# Patient Record
Sex: MALE | Race: WHITE | NOT HISPANIC OR LATINO | Employment: UNEMPLOYED | URBAN - METROPOLITAN AREA
[De-identification: names, ages, dates, MRNs, and addresses within clinical notes are randomized per-mention and may not be internally consistent; named-entity substitution may affect disease eponyms.]

---

## 2021-02-19 ENCOUNTER — TELEPHONE (OUTPATIENT)
Dept: BEHAVIORAL HEALTH | Facility: CLINIC | Age: 31
End: 2021-02-19

## 2021-02-19 ENCOUNTER — HOSPITAL ENCOUNTER (INPATIENT)
Facility: CLINIC | Age: 31
LOS: 2 days | Discharge: HOME | DRG: 897 | End: 2021-02-21
Attending: EMERGENCY MEDICINE | Admitting: PSYCHIATRY & NEUROLOGY

## 2021-02-19 DIAGNOSIS — F10.930 ALCOHOL WITHDRAWAL SYNDROME WITHOUT COMPLICATION (H): ICD-10-CM

## 2021-02-19 DIAGNOSIS — Z20.822 COVID-19 RULED OUT BY LABORATORY TESTING: ICD-10-CM

## 2021-02-19 LAB
ALBUMIN SERPL-MCNC: 4.3 G/DL (ref 3.4–5)
ALCOHOL BREATH TEST: 0 (ref 0–0.01)
ALP SERPL-CCNC: 184 U/L (ref 40–150)
ALT SERPL W P-5'-P-CCNC: 166 U/L (ref 0–70)
AMPHETAMINES UR QL SCN: NEGATIVE
ANION GAP SERPL CALCULATED.3IONS-SCNC: 9 MMOL/L (ref 3–14)
AST SERPL W P-5'-P-CCNC: 487 U/L (ref 0–45)
BARBITURATES UR QL: NEGATIVE
BASOPHILS # BLD AUTO: 0.1 10E9/L (ref 0–0.2)
BASOPHILS NFR BLD AUTO: 1 %
BENZODIAZ UR QL: NEGATIVE
BILIRUB SERPL-MCNC: 1.4 MG/DL (ref 0.2–1.3)
BUN SERPL-MCNC: 8 MG/DL (ref 7–30)
CALCIUM SERPL-MCNC: 9.2 MG/DL (ref 8.5–10.1)
CANNABINOIDS UR QL SCN: POSITIVE
CHLORIDE SERPL-SCNC: 98 MMOL/L (ref 94–109)
CO2 SERPL-SCNC: 29 MMOL/L (ref 20–32)
COCAINE UR QL: POSITIVE
CREAT SERPL-MCNC: 0.91 MG/DL (ref 0.66–1.25)
DIFFERENTIAL METHOD BLD: ABNORMAL
EOSINOPHIL # BLD AUTO: 0 10E9/L (ref 0–0.7)
EOSINOPHIL NFR BLD AUTO: 0.3 %
ERYTHROCYTE [DISTWIDTH] IN BLOOD BY AUTOMATED COUNT: 14.5 % (ref 10–15)
ETHANOL UR QL SCN: NEGATIVE
GFR SERPL CREATININE-BSD FRML MDRD: >90 ML/MIN/{1.73_M2}
GLUCOSE SERPL-MCNC: 89 MG/DL (ref 70–99)
HCT VFR BLD AUTO: 36.1 % (ref 40–53)
HGB BLD-MCNC: 12.8 G/DL (ref 13.3–17.7)
IMM GRANULOCYTES # BLD: 0.1 10E9/L (ref 0–0.4)
IMM GRANULOCYTES NFR BLD: 0.8 %
LABORATORY COMMENT REPORT: NORMAL
LYMPHOCYTES # BLD AUTO: 0.7 10E9/L (ref 0.8–5.3)
LYMPHOCYTES NFR BLD AUTO: 11.6 %
MAGNESIUM SERPL-MCNC: 1.9 MG/DL (ref 1.6–2.3)
MCH RBC QN AUTO: 34 PG (ref 26.5–33)
MCHC RBC AUTO-ENTMCNC: 35.5 G/DL (ref 31.5–36.5)
MCV RBC AUTO: 96 FL (ref 78–100)
MONOCYTES # BLD AUTO: 0.7 10E9/L (ref 0–1.3)
MONOCYTES NFR BLD AUTO: 11.4 %
NEUTROPHILS # BLD AUTO: 4.5 10E9/L (ref 1.6–8.3)
NEUTROPHILS NFR BLD AUTO: 74.9 %
NRBC # BLD AUTO: 0 10*3/UL
NRBC BLD AUTO-RTO: 0 /100
OPIATES UR QL SCN: NEGATIVE
PLATELET # BLD AUTO: 175 10E9/L (ref 150–450)
POTASSIUM SERPL-SCNC: 3.6 MMOL/L (ref 3.4–5.3)
PROT SERPL-MCNC: 8.2 G/DL (ref 6.8–8.8)
RBC # BLD AUTO: 3.76 10E12/L (ref 4.4–5.9)
SARS-COV-2 RNA RESP QL NAA+PROBE: NEGATIVE
SODIUM SERPL-SCNC: 136 MMOL/L (ref 133–144)
SPECIMEN SOURCE: NORMAL
WBC # BLD AUTO: 6 10E9/L (ref 4–11)

## 2021-02-19 PROCEDURE — 87635 SARS-COV-2 COVID-19 AMP PRB: CPT | Performed by: EMERGENCY MEDICINE

## 2021-02-19 PROCEDURE — 258N000003 HC RX IP 258 OP 636: Performed by: EMERGENCY MEDICINE

## 2021-02-19 PROCEDURE — 80320 DRUG SCREEN QUANTALCOHOLS: CPT | Performed by: EMERGENCY MEDICINE

## 2021-02-19 PROCEDURE — 96374 THER/PROPH/DIAG INJ IV PUSH: CPT | Performed by: EMERGENCY MEDICINE

## 2021-02-19 PROCEDURE — 250N000013 HC RX MED GY IP 250 OP 250 PS 637: Performed by: NURSE PRACTITIONER

## 2021-02-19 PROCEDURE — 128N000004 HC R&B CD ADULT

## 2021-02-19 PROCEDURE — 99285 EMERGENCY DEPT VISIT HI MDM: CPT | Performed by: EMERGENCY MEDICINE

## 2021-02-19 PROCEDURE — 82075 ASSAY OF BREATH ETHANOL: CPT | Performed by: EMERGENCY MEDICINE

## 2021-02-19 PROCEDURE — 83735 ASSAY OF MAGNESIUM: CPT | Performed by: EMERGENCY MEDICINE

## 2021-02-19 PROCEDURE — 99285 EMERGENCY DEPT VISIT HI MDM: CPT | Mod: 25 | Performed by: EMERGENCY MEDICINE

## 2021-02-19 PROCEDURE — 250N000013 HC RX MED GY IP 250 OP 250 PS 637: Performed by: EMERGENCY MEDICINE

## 2021-02-19 PROCEDURE — 80307 DRUG TEST PRSMV CHEM ANLYZR: CPT | Performed by: EMERGENCY MEDICINE

## 2021-02-19 PROCEDURE — 250N000011 HC RX IP 250 OP 636: Performed by: EMERGENCY MEDICINE

## 2021-02-19 PROCEDURE — 96361 HYDRATE IV INFUSION ADD-ON: CPT | Performed by: EMERGENCY MEDICINE

## 2021-02-19 PROCEDURE — 80053 COMPREHEN METABOLIC PANEL: CPT | Performed by: EMERGENCY MEDICINE

## 2021-02-19 PROCEDURE — 85025 COMPLETE CBC W/AUTO DIFF WBC: CPT | Performed by: EMERGENCY MEDICINE

## 2021-02-19 PROCEDURE — C9803 HOPD COVID-19 SPEC COLLECT: HCPCS | Performed by: EMERGENCY MEDICINE

## 2021-02-19 RX ORDER — TRAZODONE HYDROCHLORIDE 50 MG/1
50 TABLET, FILM COATED ORAL
Status: DISCONTINUED | OUTPATIENT
Start: 2021-02-19 | End: 2021-02-21 | Stop reason: HOSPADM

## 2021-02-19 RX ORDER — ONDANSETRON 2 MG/ML
4 INJECTION INTRAMUSCULAR; INTRAVENOUS ONCE
Status: COMPLETED | OUTPATIENT
Start: 2021-02-19 | End: 2021-02-19

## 2021-02-19 RX ORDER — MAGNESIUM HYDROXIDE/ALUMINUM HYDROXICE/SIMETHICONE 120; 1200; 1200 MG/30ML; MG/30ML; MG/30ML
30 SUSPENSION ORAL EVERY 4 HOURS PRN
Status: DISCONTINUED | OUTPATIENT
Start: 2021-02-19 | End: 2021-02-21 | Stop reason: HOSPADM

## 2021-02-19 RX ORDER — AMOXICILLIN 250 MG
1 CAPSULE ORAL 2 TIMES DAILY PRN
Status: DISCONTINUED | OUTPATIENT
Start: 2021-02-19 | End: 2021-02-21 | Stop reason: HOSPADM

## 2021-02-19 RX ORDER — LOPERAMIDE HCL 2 MG
2 CAPSULE ORAL 4 TIMES DAILY PRN
Status: DISCONTINUED | OUTPATIENT
Start: 2021-02-19 | End: 2021-02-21 | Stop reason: HOSPADM

## 2021-02-19 RX ORDER — FOLIC ACID 1 MG/1
1 TABLET ORAL DAILY
Status: DISCONTINUED | OUTPATIENT
Start: 2021-02-19 | End: 2021-02-19

## 2021-02-19 RX ORDER — MULTIPLE VITAMINS W/ MINERALS TAB 9MG-400MCG
1 TAB ORAL DAILY
Status: DISCONTINUED | OUTPATIENT
Start: 2021-02-20 | End: 2021-02-21 | Stop reason: HOSPADM

## 2021-02-19 RX ORDER — MULTIPLE VITAMINS W/ MINERALS TAB 9MG-400MCG
1 TAB ORAL DAILY
Status: DISCONTINUED | OUTPATIENT
Start: 2021-02-19 | End: 2021-02-19

## 2021-02-19 RX ORDER — IBUPROFEN 600 MG/1
600 TABLET, FILM COATED ORAL EVERY 6 HOURS PRN
Status: DISCONTINUED | OUTPATIENT
Start: 2021-02-19 | End: 2021-02-21 | Stop reason: HOSPADM

## 2021-02-19 RX ORDER — ONDANSETRON 4 MG/1
4 TABLET, ORALLY DISINTEGRATING ORAL EVERY 6 HOURS PRN
Status: DISCONTINUED | OUTPATIENT
Start: 2021-02-19 | End: 2021-02-21 | Stop reason: HOSPADM

## 2021-02-19 RX ORDER — IBUPROFEN 200 MG
400 TABLET ORAL EVERY 4 HOURS PRN
Status: ON HOLD | COMMUNITY
End: 2021-08-28

## 2021-02-19 RX ORDER — ATENOLOL 50 MG/1
50 TABLET ORAL DAILY PRN
Status: DISCONTINUED | OUTPATIENT
Start: 2021-02-19 | End: 2021-02-21 | Stop reason: HOSPADM

## 2021-02-19 RX ORDER — LANOLIN ALCOHOL/MO/W.PET/CERES
100 CREAM (GRAM) TOPICAL DAILY
Status: DISCONTINUED | OUTPATIENT
Start: 2021-02-19 | End: 2021-02-19

## 2021-02-19 RX ORDER — DIAZEPAM 5 MG
5-20 TABLET ORAL EVERY 30 MIN PRN
Status: DISCONTINUED | OUTPATIENT
Start: 2021-02-19 | End: 2021-02-19

## 2021-02-19 RX ORDER — FOLIC ACID 1 MG/1
1 TABLET ORAL DAILY
Status: DISCONTINUED | OUTPATIENT
Start: 2021-02-20 | End: 2021-02-21 | Stop reason: HOSPADM

## 2021-02-19 RX ORDER — LANOLIN ALCOHOL/MO/W.PET/CERES
100 CREAM (GRAM) TOPICAL DAILY
Status: DISCONTINUED | OUTPATIENT
Start: 2021-02-20 | End: 2021-02-21 | Stop reason: HOSPADM

## 2021-02-19 RX ORDER — LORAZEPAM 1 MG/1
1-4 TABLET ORAL EVERY 30 MIN PRN
Status: DISCONTINUED | OUTPATIENT
Start: 2021-02-19 | End: 2021-02-21 | Stop reason: HOSPADM

## 2021-02-19 RX ORDER — HYDROXYZINE HYDROCHLORIDE 25 MG/1
25 TABLET, FILM COATED ORAL EVERY 4 HOURS PRN
Status: DISCONTINUED | OUTPATIENT
Start: 2021-02-19 | End: 2021-02-21 | Stop reason: HOSPADM

## 2021-02-19 RX ORDER — DIAZEPAM 10 MG
10 TABLET ORAL ONCE
Status: COMPLETED | OUTPATIENT
Start: 2021-02-19 | End: 2021-02-19

## 2021-02-19 RX ADMIN — NICOTINE POLACRILEX 4 MG: 4 GUM, CHEWING BUCCAL at 16:18

## 2021-02-19 RX ADMIN — NICOTINE POLACRILEX 8 MG: 4 GUM, CHEWING BUCCAL at 20:36

## 2021-02-19 RX ADMIN — FOLIC ACID 1 MG: 1 TABLET ORAL at 13:55

## 2021-02-19 RX ADMIN — MULTIPLE VITAMINS W/ MINERALS TAB 1 TABLET: TAB at 13:55

## 2021-02-19 RX ADMIN — DIAZEPAM 10 MG: 10 TABLET ORAL at 11:04

## 2021-02-19 RX ADMIN — SODIUM CHLORIDE 1000 ML: 9 INJECTION, SOLUTION INTRAVENOUS at 11:04

## 2021-02-19 RX ADMIN — LORAZEPAM 2 MG: 1 TABLET ORAL at 20:36

## 2021-02-19 RX ADMIN — ONDANSETRON 4 MG: 2 INJECTION INTRAMUSCULAR; INTRAVENOUS at 11:04

## 2021-02-19 RX ADMIN — DIAZEPAM 10 MG: 5 TABLET ORAL at 14:03

## 2021-02-19 RX ADMIN — DIAZEPAM 5 MG: 5 TABLET ORAL at 16:18

## 2021-02-19 RX ADMIN — THIAMINE HCL TAB 100 MG 100 MG: 100 TAB at 13:55

## 2021-02-19 ASSESSMENT — ENCOUNTER SYMPTOMS
NECK STIFFNESS: 0
ARTHRALGIAS: 0
SHORTNESS OF BREATH: 0
DIFFICULTY URINATING: 0
FEVER: 0
COLOR CHANGE: 0
HEADACHES: 0
EYE REDNESS: 0
ABDOMINAL PAIN: 0
CONFUSION: 0
DYSPHORIC MOOD: 1
NAUSEA: 1

## 2021-02-19 ASSESSMENT — MIFFLIN-ST. JEOR: SCORE: 1792.72

## 2021-02-19 ASSESSMENT — ACTIVITIES OF DAILY LIVING (ADL)
HYGIENE/GROOMING: INDEPENDENT
LAUNDRY: WITH SUPERVISION
DRESS: INDEPENDENT;SCRUBS (BEHAVIORAL HEALTH)
ORAL_HYGIENE: INDEPENDENT

## 2021-02-19 NOTE — PLAN OF CARE
S:  Lobo is a 29 yo M who comes to [facility] seeking detox from alcohol.  He reports that he drinks three shots in the evening and has been drinking for a total of two years.  Writer did not find this story not credible given the elevation in his ALT & AST (See lab result in Epic 2/19/21).  He is a .  He reported to the ED MD that he drinks shots throughout the day..  He states that he vapes liquid nicotine, approximately = to one ppd of cigarettes.  His Utox was + for cocaine and cannabinoids. He states that he does not use these substances regularly but has used in the last two weeks.   He states that he receives emotional support from his Mom.  He lives in an apartment.  He has a girlfriend (Donna) who stays with him often.  He has a dog named Sergio whom he loves and it gives him satisfaction.    He denied now nor ever in the past, having any thoughts of self harm, suicide or thoughts of harming others.  He admitted that he needs to quit drinking.    He stated that he has had unprotected sex and requested the his blood be tested for HIV.    B:  He denies ever having been to detox or treatment in the past.  He denies any PMH except some knee pain from standing (he takes Ibuprofen for this).  He is not on any prescription medications at this time.    A:  Pt in moderate alcohol withdrawal AEB MSSA scores of 10 & 11  R: Obtained admission orders.  Provided with a nutritious meal and encouraged increased fluid intake.  Oriented to unit, schedule and POC.  Introduced to IM&R Treatment program.  Counseled on smoking cessation. Administered Ativan 2 mg once.  He was in the AA Zoom meeting until 20:00 and was not medicated for his initial score.  He had received a total of 25 mg of diazepam in the ED since 11 am. Writer administered nicotine gum 4 mg (2 pieces).  Continue to monitor and medicate as ordered and indicated.

## 2021-02-19 NOTE — PHARMACY-ADMISSION MEDICATION HISTORY
Admission Medication History Completed by Pharmacy    See [facility] Admission Navigator for allergy information, preferred outpatient pharmacy, prior to admission medications and immunization status.     Medication History Sources:     Lobo    Changes made to PTA medication list (reason):    Added: ibuprofen    Deleted: None    Changed: None    Additional Information:    Lobo verified his home medications.    Prior to Admission medications    Medication Sig Last Dose Taking? Auth Provider   ibuprofen (ADVIL/MOTRIN) 200 MG tablet Take 400 mg by mouth every 4 hours as needed for mild pain  Yes Unknown, Entered By History       Date completed: 02/19/21    Medication history completed by: Luz Lopez, PharmD

## 2021-02-19 NOTE — ED PROVIDER NOTES
ED Provider Note  [facility]      History     Chief Complaint   Patient presents with     Withdrawal     last drink 11pm 2/18/21, drinks throughout day     HPI  Rickey L Brunner is a 30 year old male who presents to the emergency department with concern for alcohol withdrawal.  The patient states he has been drinking alcohol daily for months.  He works as a .  He states he typically drinks shots throughout the day.  He states he typically drinks fireball.  He states that yesterday morning, he felt tremulous and nauseous when he awoke.  He resumed drinking alcohol and the symptoms resolved.  This morning, he had recurrent symptoms.  He states that he did have one episode of dry heaving today.  Patient denies a history of alcohol withdrawal seizures or DTs.  He states he is never experienced these symptoms before.  His last drink was around 11 PM last night.  No history of detox.  Patient does report a history of depression.  He does not have any outpatient providers.  He denies any suicidal ideation.  He denies any abdominal pain.  No diarrhea.  Patient denies any chest pain or dyspnea.  Denies any recent fall or injury.    Past Medical History  History reviewed. No pertinent past medical history.  Past Surgical History:   Procedure Laterality Date     ENT SURGERY      tubes     No current outpatient medications on file.    No Known Allergies  Family History  No family history on file.  Social History   Social History     Tobacco Use     Smoking status: Current Every Day Smoker     Smokeless tobacco: Never Used   Substance Use Topics     Alcohol use: Yes     Comment: daily variety     Drug use: Yes     Types: Marijuana      Past medical history, past surgical history, medications, allergies, family history, and social history were reviewed with the patient. No additional pertinent items.       Review of Systems   Constitutional: Negative for fever.   HENT: Negative for congestion.  "   Eyes: Negative for redness.   Respiratory: Negative for shortness of breath.    Cardiovascular: Negative for chest pain.   Gastrointestinal: Positive for nausea. Negative for abdominal pain.   Genitourinary: Negative for difficulty urinating.   Musculoskeletal: Negative for arthralgias and neck stiffness.   Skin: Negative for color change.   Neurological: Negative for headaches.   Psychiatric/Behavioral: Positive for dysphoric mood. Negative for confusion and suicidal ideas.   All other systems reviewed and are negative.    A complete review of systems was performed with pertinent positives and negatives noted in the HPI, and all other systems negative.    Physical Exam   BP: 127/87  Pulse: 98  Temp: 98.1  F (36.7  C)  Resp: 18  Height: 177.8 cm (5' 10\")  Weight: 81.6 kg (180 lb)  SpO2: 94 %  Physical Exam  Vitals signs and nursing note reviewed.   Constitutional:       General: He is not in acute distress.     Appearance: He is not diaphoretic.   HENT:      Head: Normocephalic and atraumatic.      Mouth/Throat:      Comments: No tongue fasciculations.  Eyes:      General: No scleral icterus.     Pupils: Pupils are equal, round, and reactive to light.   Cardiovascular:      Rate and Rhythm: Normal rate and regular rhythm.      Pulses: Normal pulses.      Heart sounds: Normal heart sounds.   Pulmonary:      Effort: Pulmonary effort is normal. No respiratory distress.      Breath sounds: Normal breath sounds.   Abdominal:      General: Bowel sounds are normal.      Palpations: Abdomen is soft.      Tenderness: There is no abdominal tenderness.   Musculoskeletal: Normal range of motion.         General: No tenderness.   Skin:     General: Skin is warm and dry.      Findings: No rash.   Neurological:      General: No focal deficit present.      Mental Status: He is oriented to person, place, and time.      GCS: GCS eye subscore is 4. GCS verbal subscore is 5. GCS motor subscore is 6.      Motor: No weakness.      " Coordination: Coordination normal.      Gait: Gait normal.      Comments: Tremulous   Psychiatric:         Mood and Affect: Affect is blunt.         Thought Content: Thought content does not include suicidal ideation.         ED Course      Procedures           Results for orders placed or performed during the hospital encounter of 02/19/21   CBC with platelets differential     Status: Abnormal   Result Value Ref Range    WBC 6.0 4.0 - 11.0 10e9/L    RBC Count 3.76 (L) 4.4 - 5.9 10e12/L    Hemoglobin 12.8 (L) 13.3 - 17.7 g/dL    Hematocrit 36.1 (L) 40.0 - 53.0 %    MCV 96 78 - 100 fl    MCH 34.0 (H) 26.5 - 33.0 pg    MCHC 35.5 31.5 - 36.5 g/dL    RDW 14.5 10.0 - 15.0 %    Platelet Count 175 150 - 450 10e9/L    Diff Method Automated Method     % Neutrophils 74.9 %    % Lymphocytes 11.6 %    % Monocytes 11.4 %    % Eosinophils 0.3 %    % Basophils 1.0 %    % Immature Granulocytes 0.8 %    Nucleated RBCs 0 0 /100    Absolute Neutrophil 4.5 1.6 - 8.3 10e9/L    Absolute Lymphocytes 0.7 (L) 0.8 - 5.3 10e9/L    Absolute Monocytes 0.7 0.0 - 1.3 10e9/L    Absolute Eosinophils 0.0 0.0 - 0.7 10e9/L    Absolute Basophils 0.1 0.0 - 0.2 10e9/L    Abs Immature Granulocytes 0.1 0 - 0.4 10e9/L    Absolute Nucleated RBC 0.0    Comprehensive metabolic panel     Status: Abnormal   Result Value Ref Range    Sodium 136 133 - 144 mmol/L    Potassium 3.6 3.4 - 5.3 mmol/L    Chloride 98 94 - 109 mmol/L    Carbon Dioxide 29 20 - 32 mmol/L    Anion Gap 9 3 - 14 mmol/L    Glucose 89 70 - 99 mg/dL    Urea Nitrogen 8 7 - 30 mg/dL    Creatinine 0.91 0.66 - 1.25 mg/dL    GFR Estimate >90 >60 mL/min/[1.73_m2]    GFR Estimate If Black >90 >60 mL/min/[1.73_m2]    Calcium 9.2 8.5 - 10.1 mg/dL    Bilirubin Total 1.4 (H) 0.2 - 1.3 mg/dL    Albumin 4.3 3.4 - 5.0 g/dL    Protein Total 8.2 6.8 - 8.8 g/dL    Alkaline Phosphatase 184 (H) 40 - 150 U/L     (H) 0 - 70 U/L     (H) 0 - 45 U/L   Magnesium     Status: None   Result Value Ref Range     Magnesium 1.9 1.6 - 2.3 mg/dL   Alcohol breath test POCT     Status: Normal   Result Value Ref Range    Alcohol Breath Test 0.000 0.00 - 0.01     Medications   0.9% sodium chloride BOLUS (1,000 mLs Intravenous New Bag 2/19/21 1104)   diazepam (VALIUM) tablet 5-20 mg (has no administration in time range)   thiamine (B-1) tablet 100 mg (has no administration in time range)   folic acid (FOLVITE) tablet 1 mg (has no administration in time range)   multivitamin w/minerals (THERA-VIT-M) tablet 1 tablet (has no administration in time range)   ondansetron (ZOFRAN) injection 4 mg (4 mg Intravenous Given 2/19/21 1104)   diazepam (VALIUM) tablet 10 mg (10 mg Oral Given 2/19/21 1104)        Assessments & Plan (with Medical Decision Making)   30 year old male to the emergency department seeking detox from alcohol.  He has been drinking alcohol daily and develop symptoms of withdrawal yesterday morning.  He had recurrent symptoms this morning.  He arrives to the emergency department with an undetectable alcohol level and clinical evidence for alcohol withdrawal.  Differential diagnosis includes alcohol withdrawal, anxiety, and stimulant intoxication.  Labs and urine toxicology ordered.  He was established.  The patient was given a single dose of ondansetron.  He was given oral Valium and his symptoms are now well controlled.  Suspect symptoms related to alcohol withdrawal.  No history of stimulant abuse.  Patient's labs remarkable for some liver enzyme elevation and anemia.  He does not have significant electrolyte abnormality.  The patient is to tolerate p.o.  He does endorse some symptoms of depression but denies suicidal ideation.  He is asymptomatic for Covid and swab is currently pending.  The patient appears medically stable for detox admission.        I have reviewed all of today's labs and/or imaging.     I have reviewed the nursing notes. I have reviewed the findings, diagnosis, plan and need for follow up with the  patient.    New Prescriptions    No medications on file       Final diagnoses:   Alcohol withdrawal syndrome without complication (H)     Chart documentation was completed with Dragon voice-recognition software. Even though reviewed, this chart may still contain some grammatical, spelling, and word errors.     --  Neri Lopez Md  [facility] EMERGENCY DEPARTMENT  2/19/2021     Neri Lopez MD  02/19/21 5262

## 2021-02-19 NOTE — TELEPHONE ENCOUNTER
Pt presents in [facility] seeking detox.  B: Pt works as  and states he has been drinking daily for months, close to 6 months. Pt states he cant quantify amount sbecasue its shots throughout the day to intoxication. Pt tstaes he wakes up with tremors, nausea, dry heaves and has to drink to subside the withdrawals. Denies seizures, dt's or MH symptoms.   A:   etoh detox.calm, cooperative, vol.  R: veluvali/3a  Patient cleared and ready for behavioral bed placement: Yes   Asymptomatic, test pending

## 2021-02-19 NOTE — ED NOTES
"ED to Behavioral Floor Handoff    SITUATION  Rickey L Brunner is a 30 year old male who speaks English and lives in a home alone The patient arrived in the ED by private car from home with a complaint of Withdrawal (last drink 11pm 2/18/21, drinks throughout day)  .The patient's current symptoms started/worsened 2 year(s) ago and during this time the symptoms have increased.   In the ED, pt was diagnosed with   Final diagnoses:   Alcohol withdrawal syndrome without complication (H)        Initial vitals were: BP: 127/87  Pulse: 98  Temp: 98.1  F (36.7  C)  Resp: 18  Height: 177.8 cm (5' 10\")  Weight: 81.6 kg (180 lb)  SpO2: 94 %   --------  Is the patient diabetic? No   If yes, last blood glucose? --     If yes, was this treated in the ED? --  --------  Is the patient inebriated (ETOH) No or Impaired on other substances? No  MSSA done? Yes  Last MSSA score: 18    Were withdrawal symptoms treated? Yes  Does the patient have a seizure history? No. If yes, date of most recent seizure--  --------  Is the patient patient experiencing suicidal ideation? denies current or recent suicidal ideation     Homicidal ideation? denies current or recent homicidal ideation or behaviors.    Self-injurious behavior/urges? denies current or recent self injurious behavior or ideation.  ------  Was pt aggressive in the ED No  Was a code called No  Is the pt now cooperative? No  -------  Meds given in ED:   Medications   diazepam (VALIUM) tablet 5-20 mg (10 mg Oral Given 2/19/21 1403)   thiamine (B-1) tablet 100 mg (100 mg Oral Given 2/19/21 1355)   folic acid (FOLVITE) tablet 1 mg (1 mg Oral Given 2/19/21 1355)   multivitamin w/minerals (THERA-VIT-M) tablet 1 tablet (1 tablet Oral Given 2/19/21 1355)   0.9% sodium chloride BOLUS (0 mLs Intravenous Stopped 2/19/21 1205)   ondansetron (ZOFRAN) injection 4 mg (4 mg Intravenous Given 2/19/21 1104)   diazepam (VALIUM) tablet 10 mg (10 mg Oral Given 2/19/21 9835)      Family present during ED " course? Yes  Family currently present? Yes    BACKGROUND  Does the patient have a cognitive impairment or developmental disability? No  Allergies: No Known Allergies.   Social demographics are   Social History     Socioeconomic History     Marital status: Single     Spouse name: None     Number of children: None     Years of education: None     Highest education level: None   Occupational History     None   Social Needs     Financial resource strain: None     Food insecurity     Worry: None     Inability: None     Transportation needs     Medical: None     Non-medical: None   Tobacco Use     Smoking status: Current Every Day Smoker     Smokeless tobacco: Never Used   Substance and Sexual Activity     Alcohol use: Yes     Comment: daily variety     Drug use: Yes     Types: Marijuana     Sexual activity: None   Lifestyle     Physical activity     Days per week: None     Minutes per session: None     Stress: None   Relationships     Social connections     Talks on phone: None     Gets together: None     Attends Jew service: None     Active member of club or organization: None     Attends meetings of clubs or organizations: None     Relationship status: None     Intimate partner violence     Fear of current or ex partner: None     Emotionally abused: None     Physically abused: None     Forced sexual activity: None   Other Topics Concern     None   Social History Narrative     None        ASSESSMENT  Labs results   Labs Ordered and Resulted from Time of ED Arrival Up to the Time of Departure from the ED   DRUG ABUSE SCREEN 6 CHEM DEP URINE ([facility]) - Abnormal; Notable for the following components:       Result Value    Cannabinoids Qual Urine Positive (*)     Cocaine Qual Urine Positive (*)     All other components within normal limits   CBC WITH PLATELETS DIFFERENTIAL - Abnormal; Notable for the following components:    RBC Count 3.76 (*)     Hemoglobin 12.8 (*)     Hematocrit 36.1 (*)     MCH 34.0 (*)     Absolute  "Lymphocytes 0.7 (*)     All other components within normal limits   COMPREHENSIVE METABOLIC PANEL - Abnormal; Notable for the following components:    Bilirubin Total 1.4 (*)     Alkaline Phosphatase 184 (*)      (*)      (*)     All other components within normal limits   ALCOHOL BREATH TEST POCT - Normal   MAGNESIUM   SARS-COV-2 (COVID-19) VIRUS RT-PCR   PERIPHERAL IV CATHETER   MSSA SCORE AND VS   NOTIFY      Imaging Studies: No results found for this or any previous visit (from the past 24 hour(s)).   Most recent vital signs /84   Pulse 101   Temp 97.8  F (36.6  C) (Oral)   Resp 16   Ht 1.778 m (5' 10\")   Wt 81.6 kg (180 lb)   SpO2 99%   BMI 25.83 kg/m     Abnormal labs/tests/findings requiring intervention:---   Pain control: pt had none  Nausea control: good    RECOMMENDATION  Are any infection precautions needed (MRSA, VRE, etc.)? No If yes, what infection? --  ---  Does the patient have mobility issues? independently. If yes, what device does the pt use? ---  ---  Is patient on 72 hour hold or commitment? No If on 72 hour hold, have hold and rights been given to patient? No  Are admitting orders written if after 10 p.m. ?N/A  Tasks needing to be completed: Asymptomatic Covid pending    Ying Castellano, RN   [phone] [facility]   [phone] [facility] ED    "

## 2021-02-19 NOTE — ED NOTES
"ED to Behavioral Floor Handoff    SITUATION  Rickey L Brunner is a 30 year old male who speaks English and lives in a home with family members The patient arrived in the ED by private car from emergency room with a complaint of Withdrawal (last drink 11pm 2/18/21, drinks throughout day)  .The patient's current symptoms started/worsened 1 day(s) ago and during this time the symptoms have increased.   In the ED, pt was diagnosed with   Final diagnoses:   Alcohol withdrawal syndrome without complication (H)        Initial vitals were: BP: 127/87  Pulse: 98  Temp: 98.1  F (36.7  C)  Resp: 18  Height: 177.8 cm (5' 10\")  Weight: 81.6 kg (180 lb)  SpO2: 94 %   --------  Is the patient diabetic? No   If yes, last blood glucose? --     If yes, was this treated in the ED? --  --------  Is the patient inebriated (ETOH) No or Impaired on other substances? No  MSSA done? Yes  Last MSSA score: 8   Were withdrawal symptoms treated? Yes  Does the patient have a seizure history? No. If yes, date of most recent seizure--  --------  Is the patient patient experiencing suicidal ideation? denies current or recent suicidal ideation     Homicidal ideation? denies current or recent homicidal ideation or behaviors.    Self-injurious behavior/urges? denies current or recent self injurious behavior or ideation.  ------  Was pt aggressive in the ED No  Was a code called No  Is the pt now cooperative? Yes  -------  Meds given in ED:   Medications   diazepam (VALIUM) tablet 5-20 mg (5 mg Oral Given 2/19/21 1618)   thiamine (B-1) tablet 100 mg (100 mg Oral Given 2/19/21 1355)   folic acid (FOLVITE) tablet 1 mg (1 mg Oral Given 2/19/21 1355)   multivitamin w/minerals (THERA-VIT-M) tablet 1 tablet (1 tablet Oral Given 2/19/21 1355)   0.9% sodium chloride BOLUS (0 mLs Intravenous Stopped 2/19/21 1205)   ondansetron (ZOFRAN) injection 4 mg (4 mg Intravenous Given 2/19/21 1104)   diazepam (VALIUM) tablet 10 mg (10 mg Oral Given 2/19/21 8497) "   nicotine polacrilex (NICORETTE) gum 4 mg (4 mg Buccal Given 2/19/21 3120)      Family present during ED course? Yes  Family currently present? Yes    BACKGROUND  Does the patient have a cognitive impairment or developmental disability? No  Allergies: No Known Allergies.   Social demographics are   Social History     Socioeconomic History     Marital status: Single     Spouse name: None     Number of children: None     Years of education: None     Highest education level: None   Occupational History     None   Social Needs     Financial resource strain: None     Food insecurity     Worry: None     Inability: None     Transportation needs     Medical: None     Non-medical: None   Tobacco Use     Smoking status: Current Every Day Smoker     Smokeless tobacco: Never Used   Substance and Sexual Activity     Alcohol use: Yes     Comment: daily variety     Drug use: Yes     Types: Marijuana     Sexual activity: None   Lifestyle     Physical activity     Days per week: None     Minutes per session: None     Stress: None   Relationships     Social connections     Talks on phone: None     Gets together: None     Attends Shinto service: None     Active member of club or organization: None     Attends meetings of clubs or organizations: None     Relationship status: None     Intimate partner violence     Fear of current or ex partner: None     Emotionally abused: None     Physically abused: None     Forced sexual activity: None   Other Topics Concern     None   Social History Narrative     None        ASSESSMENT  Labs results   Labs Ordered and Resulted from Time of ED Arrival Up to the Time of Departure from the ED   DRUG ABUSE SCREEN 6 CHEM DEP URINE ([facility]) - Abnormal; Notable for the following components:       Result Value    Cannabinoids Qual Urine Positive (*)     Cocaine Qual Urine Positive (*)     All other components within normal limits   CBC WITH PLATELETS DIFFERENTIAL - Abnormal; Notable for the following  "components:    RBC Count 3.76 (*)     Hemoglobin 12.8 (*)     Hematocrit 36.1 (*)     MCH 34.0 (*)     Absolute Lymphocytes 0.7 (*)     All other components within normal limits   COMPREHENSIVE METABOLIC PANEL - Abnormal; Notable for the following components:    Bilirubin Total 1.4 (*)     Alkaline Phosphatase 184 (*)      (*)      (*)     All other components within normal limits   ALCOHOL BREATH TEST POCT - Normal   MAGNESIUM   SARS-COV-2 (COVID-19) VIRUS RT-PCR   PERIPHERAL IV CATHETER   MSSA SCORE AND VS   NOTIFY      Imaging Studies: No results found for this or any previous visit (from the past 24 hour(s)).   Most recent vital signs /72   Pulse 85   Temp 97.7  F (36.5  C) (Oral)   Resp 12   Ht 1.778 m (5' 10\")   Wt 81.6 kg (180 lb)   SpO2 97%   BMI 25.83 kg/m     Abnormal labs/tests/findings requiring intervention:---   Pain control: good  Nausea control: fair    RECOMMENDATION  Are any infection precautions needed (MRSA, VRE, etc.)? No If yes, what infection? --  ---  Does the patient have mobility issues? independently. If yes, what device does the pt use? ---  ---  Is patient on 72 hour hold or commitment? No If on 72 hour hold, have hold and rights been given to patient? No  Are admitting orders written if after 10 p.m. ?No  Tasks needing to be completed:---     Yvan Browning, RN     [phone] [facility]   [phone] [facility] ED    "

## 2021-02-19 NOTE — ED TRIAGE NOTES
Patient here for alcohol withdrawal. He is a  and states unsure how much he drinks but drinks constantly throughout the day. Last drink was last evening around 11pm. Today having nausea, vomiting, chills and shaking.

## 2021-02-19 NOTE — ED NOTES
Patient was updated on the plan of care and was shown the education video for detox. Patient score 4 on the MSSA. No valium given. Patient was calm and cooperative. Patients mother was in the room at the time of assessment, now has left.

## 2021-02-19 NOTE — PROGRESS NOTES
02/19/21 9317   Patient Belongings   Did you bring any home meds/supplements to the hospital?  No   Patient Belongings other (see comments)   Patient Belongings Remaining with Patient other (see comments)   Patient Belongings Put in Hospital Secure Location (Security or Locker, etc.) other (see comments)   Belongings Search Yes   Clothing Search Yes   Second Staff Lonnie     STORAGE BIN:   Coat, shoes, belt, mask, keys  A             Admission:  I am responsible for any personal items that are not sent to the safe or pharmacy.  [facility] is not responsible for loss, theft or damage of any property in my possession.    Signature:  _________________________________ Date: _______  Time: _____                                              Staff Signature:  ____________________________ Date: ________  Time: _____      2nd Staff person, if patient is unable/unwilling to sign:    Signature: ________________________________ Date: ________  Time: _____   Discharge:  [facility] has returned all of my personal belongings:    Signature: _________________________________ Date: ________  Time: _____                                          Staff Signature:  ____________________________ Date: ________  Time: _____

## 2021-02-20 LAB
CHOLEST SERPL-MCNC: 277 MG/DL
FERRITIN SERPL-MCNC: 3555 NG/ML (ref 26–388)
FOLATE SERPL-MCNC: 58 NG/ML
GGT SERPL-CCNC: 1863 U/L (ref 0–75)
HDLC SERPL-MCNC: 95 MG/DL
IRON SATN MFR SERPL: 53 % (ref 15–46)
IRON SERPL-MCNC: 90 UG/DL (ref 35–180)
LDLC SERPL CALC-MCNC: 164 MG/DL
NONHDLC SERPL-MCNC: 182 MG/DL
TIBC SERPL-MCNC: 170 UG/DL (ref 240–430)
TRIGL SERPL-MCNC: 92 MG/DL
VIT B12 SERPL-MCNC: 421 PG/ML (ref 193–986)

## 2021-02-20 PROCEDURE — 86803 HEPATITIS C AB TEST: CPT | Performed by: PHYSICIAN ASSISTANT

## 2021-02-20 PROCEDURE — 128N000004 HC R&B CD ADULT

## 2021-02-20 PROCEDURE — 83540 ASSAY OF IRON: CPT | Performed by: PHYSICIAN ASSISTANT

## 2021-02-20 PROCEDURE — 82746 ASSAY OF FOLIC ACID SERUM: CPT | Performed by: PHYSICIAN ASSISTANT

## 2021-02-20 PROCEDURE — 87389 HIV-1 AG W/HIV-1&-2 AB AG IA: CPT | Performed by: NURSE PRACTITIONER

## 2021-02-20 PROCEDURE — 99222 1ST HOSP IP/OBS MODERATE 55: CPT | Performed by: PHYSICIAN ASSISTANT

## 2021-02-20 PROCEDURE — 99223 1ST HOSP IP/OBS HIGH 75: CPT | Performed by: PSYCHIATRY & NEUROLOGY

## 2021-02-20 PROCEDURE — 86706 HEP B SURFACE ANTIBODY: CPT | Performed by: PHYSICIAN ASSISTANT

## 2021-02-20 PROCEDURE — 82977 ASSAY OF GGT: CPT | Performed by: NURSE PRACTITIONER

## 2021-02-20 PROCEDURE — 80061 LIPID PANEL: CPT | Performed by: NURSE PRACTITIONER

## 2021-02-20 PROCEDURE — 36415 COLL VENOUS BLD VENIPUNCTURE: CPT | Performed by: NURSE PRACTITIONER

## 2021-02-20 PROCEDURE — 82728 ASSAY OF FERRITIN: CPT | Performed by: PHYSICIAN ASSISTANT

## 2021-02-20 PROCEDURE — 83550 IRON BINDING TEST: CPT | Performed by: PHYSICIAN ASSISTANT

## 2021-02-20 PROCEDURE — 87340 HEPATITIS B SURFACE AG IA: CPT | Performed by: PHYSICIAN ASSISTANT

## 2021-02-20 PROCEDURE — 36415 COLL VENOUS BLD VENIPUNCTURE: CPT | Performed by: PHYSICIAN ASSISTANT

## 2021-02-20 PROCEDURE — HZ2ZZZZ DETOXIFICATION SERVICES FOR SUBSTANCE ABUSE TREATMENT: ICD-10-PCS | Performed by: PSYCHIATRY & NEUROLOGY

## 2021-02-20 PROCEDURE — 99207 PR CONSULT E&M CHANGED TO INITIAL LEVEL: CPT | Performed by: PHYSICIAN ASSISTANT

## 2021-02-20 PROCEDURE — 82607 VITAMIN B-12: CPT | Performed by: PHYSICIAN ASSISTANT

## 2021-02-20 PROCEDURE — 250N000013 HC RX MED GY IP 250 OP 250 PS 637: Performed by: NURSE PRACTITIONER

## 2021-02-20 RX ADMIN — THIAMINE HCL TAB 100 MG 100 MG: 100 TAB at 08:23

## 2021-02-20 RX ADMIN — MULTIPLE VITAMINS W/ MINERALS TAB 1 TABLET: TAB at 08:23

## 2021-02-20 RX ADMIN — NICOTINE POLACRILEX 4 MG: 4 GUM, CHEWING BUCCAL at 18:00

## 2021-02-20 RX ADMIN — NICOTINE POLACRILEX 8 MG: 4 GUM, CHEWING BUCCAL at 16:20

## 2021-02-20 RX ADMIN — LORAZEPAM 1 MG: 1 TABLET ORAL at 16:18

## 2021-02-20 RX ADMIN — LORAZEPAM 1 MG: 1 TABLET ORAL at 01:08

## 2021-02-20 RX ADMIN — FOLIC ACID 1 MG: 1 TABLET ORAL at 08:23

## 2021-02-20 RX ADMIN — NICOTINE POLACRILEX 8 MG: 4 GUM, CHEWING BUCCAL at 20:16

## 2021-02-20 RX ADMIN — LORAZEPAM 2 MG: 1 TABLET ORAL at 08:23

## 2021-02-20 RX ADMIN — LORAZEPAM 2 MG: 1 TABLET ORAL at 12:00

## 2021-02-20 RX ADMIN — NICOTINE POLACRILEX 8 MG: 4 GUM, CHEWING BUCCAL at 08:23

## 2021-02-20 RX ADMIN — LORAZEPAM 2 MG: 1 TABLET ORAL at 20:16

## 2021-02-20 RX ADMIN — LORAZEPAM 1 MG: 1 TABLET ORAL at 04:24

## 2021-02-20 ASSESSMENT — ACTIVITIES OF DAILY LIVING (ADL)
HYGIENE/GROOMING: INDEPENDENT
ORAL_HYGIENE: INDEPENDENT
DRESS: INDEPENDENT;STREET CLOTHES;SCRUBS (BEHAVIORAL HEALTH)
LAUNDRY: WITH SUPERVISION

## 2021-02-20 NOTE — PLAN OF CARE
Patient has experienced a satisfactory day on [facility].  He reports some anxiety and minor symptoms related to his alcohol withdrawal, and has been receiving withdrawal-related medications accordingly.  He reports an absence of suicidal ideation, and is looking forward to his eventual discharge from [facility].  He appears not to have a deep understanding of his situation, with an undercurrent of minimization of his current addiction-related status.  Will continue to monitor as prudent.

## 2021-02-20 NOTE — PROGRESS NOTES
"CLINICAL NUTRITION SERVICES - ASSESSMENT NOTE     Nutrition Prescription    RECOMMENDATIONS FOR MDs/PROVIDERS TO ORDER:  None today - pt is on appropriate vitamin supplementation     Malnutrition Status:    Unable to determine due to no NFPA    Recommendations already ordered by Registered Dietitian (RD):  Boost once daily at breakfast meal to encourage adequate intakes     Future/Additional Recommendations:  Monitor meal intakes and supplement acceptance  Monitor weight and lab trends  Follow up with phone call as able      REASON FOR ASSESSMENT  Rickey L Brunner is a/an 30 year old male assessed by the dietitian for Positive Admission Nutrition Risk Screen (unintentional weight loss and poor appetite)     NUTRITION HISTORY  Per chart review: Pt admits to facility for ETOH detox with elevated LFT's and anemia, no other past medical history noted.    Per pt visit: Attempted phone call, pt in bed, visit deferred today.     Per Provider note: Pt did notice unintentional weight loss of 15 lbs over the last 6 months, but feels it is likely due to not eating out as much, and pt states his diet is poor, but ate a good breakfast this morning without difficulty.     CURRENT NUTRITION ORDERS  Diet: Regular  Intake/Tolerance: unable to determine today     LABS  Labs reviewed    MEDICATIONS  Folvite, MVI, Thiamine     ANTHROPOMETRICS  Height: 177.8 cm (5' 10\")  Most Recent Weight: 81.6 kg (180 lb)    IBW: 75.4 kg  BMI: Overweight BMI 25-29.9  Weight History: Unable to determine today     Dosing Weight: 81.6 kg    ASSESSED NUTRITION NEEDS  Estimated Energy Needs: 2040 kcals/day (25 kcals/kg)  Justification: Maintenance  Estimated Protein Needs: 65-80 grams protein/day (0.8 - 1 grams of pro/kg)  Justification: Maintenance  Estimated Fluid Needs: 2040 mL/day (25 mL/kg)   Justification: Maintenance    MALNUTRITION  % Intake: Unable to assess  % Weight Loss: Unable to assess  Subcutaneous Fat Loss: Unable to assess  Muscle Loss: " Unable to assess  Fluid Accumulation/Edema: None noted  Malnutrition Diagnosis: Unable to determine due to no NFPA    NUTRITION DIAGNOSIS  Predicted inadequate nutrient intake related to ETOH abuse as evidenced by admitting diagnosis       INTERVENTIONS  Implementation  Medical food supplement therapy - ordered as above      Goals  Patient to consume % of nutritionally adequate meal trays TID, or the equivalent with supplements/snacks.     Monitoring/Evaluation  Progress toward goals will be monitored and evaluated per protocol.    Ana Barger RD, CNSC, LD  ELYSSA RD pager: [phone]

## 2021-02-20 NOTE — PROGRESS NOTES
Pt.is on MSSA with ativan protocol for alcohol withdrawal. His MSSA scores tonight = 8 & 8. He got 1mg prn ativan tablet X2. Will continue to monitor.

## 2021-02-20 NOTE — H&P
"[facility]  Psychiatric History and Physical      Patient name: Rickey L Brunner   MRN: [id number]    Age: 30 year old    YOB: 1990    Identifying information:   The patient is a 30 year old  male    Chief complaint:  \" I need to detox.\"    History of present illness: The patient has a history of alcohol usage who presented to the emergency room reporting concern for emerging alcohol withdrawal symptoms.  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  He recently noticed that he would wake in the morning feeling tremulous and nauseous and the symptoms would resolve when drinking would resume.  In the emergency room, his alcohol breath test was 0 however his transaminases were fairly elevated.  On examination today, the patient reports casual and social usage of alcohol for a few years which recently worsened during the COVID pandemic.  He explains that needing to isolate at home for 3 months period, introduced boredom leading to increased alcohol use.  He was recently allowed to return back to work however he continued to consume alcohol in larger quantities, eventually realizing withdrawal symptoms, and seeking assistance.  He denied any contributing psychosocial stressors. His treatment goals are to detox from alcohol then seek sobriety support through AA.    Psychiatric Review of Systems:    -- Depressive episode: Denied symptoms including denial of suicidal and homicidal thoughts.  -- Camelia:  denies symptoms  -- Psychosis:  denies symptoms  -- Anxiety: He endorsed symptoms of a generalized anxiety disorder, reporting excessive worries, present throughout most of the day, to a moderate intensity, and occasionally influencing behaviors or decisions.  No significant panic attacks.  -- PTSD: denies symptoms  -- OCD: denies  symptoms  -- Eating disorder: denies " "symptoms    Psychiatric History:    No prior psychiatric treatment history.  No prior hospitalizations.  No prior suicide attempts.    Substance Use History:    Alcohol usage as described above, noting progressive use, excessive use, drinking to the point of blackout, frequent intoxication, loss of control over usage, 1 prior DUI, and recent emergence of withdrawal symptoms.  No history of DTs or seizures.  No history of detox or treatment.  Occasional cannabis usage however not reported to be problematic or resulting in dependency.    Medical History:    Past Medical History:   Diagnosis Date     Alcohol dependence (H)        Medications:   No current facility-administered medications on file prior to encounter.   ibuprofen (ADVIL/MOTRIN) 200 MG tablet, Take 400 mg by mouth every 4 hours as needed for mild pain         Social History:  Refer to the psychosocial assessment completed by our .  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     Family History:    Aunts with chemical dependency and presumed to have a mental health condition.  History reviewed. No pertinent family history.     Medical review of systems: 10 systems were reviewed and positive for psychiatric symptoms as noted above otherwise negative    Physical Exam:    B/P: 117/78, T: 98.4, P: 107, R: 16  Estimated body mass index is 25.83 kg/m  as calculated from the following:    Height as of this encounter: 1.778 m (5' 10\").    Weight as of this encounter: 81.6 kg (180 lb).    The rest of the physical examination was reviewed in the emergency room note completed by the emergency room physician.      Mental status examination:  Appearance:  Alert, fair hygiene, no acute distress  Attitude:  Attempts to be cooperative  Eye Contact: Fair  Mood: \"Anxious\"  Affect: Mood congruent and blunted  Speech:  Normal rates, tone, latency, volume. Not pushed or pressured.  Psychomotor Behavior:  No psychomotor abnormalities noted  Thought " Process: Linear and logical; not tangential or circumstantial or disorganized  Associations:  Logical; no loose associations Noted  Thought Content:  No obvious paranoia, delusions, ideas of reference, or grandiosity noted. Denies auditory or visual hallucinations. Denies suicidal Ideations. Denies homicidal ideations.  Insight:  Fair  Judgment:  Fair  Oriented to:  time, person, and place  Attention Span and Concentration:  Intact  Recent and Remote Memory: Intact based on interviewing and details provided  Language: Appropriate based on interviewing  Fund of Knowledge: Appropriate based on interviewing  Muscle Strength and Tone: Normal upon visual inspection  Gait and Station: Normal upon visual inspection            Diagnoses:    Alcohol use disorder, severe  Alcohol withdrawal  Generalized anxiety disorder  Cannabis use disorder, mild  Nicotine use disorder, mild  Transaminitis       Plan:  1.  The patient has been admitted to [facility] voluntarily to detox from alcohol.  His primary team will resume his care Monday morning.    2.  [facility] protocol with Valium for management of alcohol withdrawal symptoms.  The patient was educated regarding treatment modes that would help reduce symptoms of his anxiety disorder.  He will consider these options and follow-up with his treatment team if interested.  Labs are reviewed today noting significant transaminitis which is likely secondary to alcohol usage.  Internal medicine consultation to ensure medical stability.  They have recommended to repeat LFT in 1 week or sooner if symptomatic.    3. Psychosocial treatments to be addressed with social work consult and groups.  -The patient plans to pursue sobriety support through AA    4.  His primary treatment team will help him identify the most optimal disposition plan.

## 2021-02-20 NOTE — CONSULTS
[facility]  Consult Note - Hospitalist Service     Date of Admission:  2/19/2021  Consult Requested by: Jairon Berger MD  Reason for Consult: General Medical Evaluation, Co-management of withdrawal of chemical dependency     Assessment & Plan   Rickey L Brunner is a 30 year old male with PMHx of alcohol dependence admitted on 2/19/2021, to inpatient psychiatry for for alcohol detox.     #Alcohol dependence with withdrawal  Drinking heavily prior to admission. Denies any hx of withdrawal seizures or DTs.   -Agree with [facility] protocol, management per psychiatry   -Agree with thiamine, folic acid and MVI    #Transaminitis   Elevated T bili at 1.4, Alk phos 184, , and . Elevated GGT to 1863. Patient denies any abdominal pain, N/V, or jaundice. Denies any hx of liver disease. Likely alcohol hepatitis.   -Repeat LFTs tomorrow  -Check Hep C ab, Hep B surface ab and antigen   -Avoid hepatotoxic agents  -Alcohol cessation   -IF LFTs are down trending, recommend repeat LFTs in 1 week with PCP     #Anemia   Hemoglobin 12.8 and Hematocrit 36.1. MCV 96. Patient denies any easy bruising or blood loss including black or bloody stools, or hematuria. States his diet is poor, possibly nutritional deficiency vs alcohol use.   -Repeat H/H in AM  -Iron panel, ferritin, B12 and folate levels  -Outpatient follow up with repeat CBC in 1 week with PCP    #Tobacco dependence - Vapes 1 cartridge daily.   -Agree with nicotine gum  -Smoking cessation    #Health maintenance - Patient requested HIV testing, was sent by psychiatry and pending.      Medicine will follow up repeat LFTs, HBV, HCV studies, iron, ferritin, B12 and folate levels.  No further recommendations at this time. Please page the on-call [facility] for any intercurrent medical issues which arise.       SHAI Mcfarland [facility]  Contact information available via [facility]  "Paging/Directory    ______________________________________________________________________    Chief Complaint   Alcohol detox     History is obtained from the patient    History of Present Illness   Rickey L Brunner is a 30 year old male with PMHx of alcohol dependence admitted on 2/19/2021, to inpatient psychiatry for for alcohol detox.     Patient states he works as a  and drinks various amounts of alcohol daily, unable to quantify specifically but states \"several drinks\" daily.  States this is his first admission for alcohol detox. States he occasionally uses marijuana but denies any other drug use. Denies any IVDU. Denies taking any medications regularly. Denies taking any tylenol. Denies any fevers, chest pain, SOB, N/V/D/C, abdominal pain, dysuria, hematuria, joint pain or swelling, back pain, or jaundice. Patient did notice unintentional weight loss of 15 lbs over the last 6 months, but feels it is likely due to not eating out as much. Denies any bleeding including easy bruising, black or bloody stools. States his diet is poor, but ate a good breakfast this morning without difficulty. Patient requests if he could be tested for HIV, but does not want full STI testing at this time.     Review of Systems   The 10 point Review of Systems is negative other than noted in the HPI or here.     Past Medical History    I have reviewed this patient's medical history and updated it with pertinent information if needed.   Past Medical History:   Diagnosis Date     Alcohol dependence (H)        Past Surgical History   I have reviewed this patient's surgical history and updated it with pertinent information if needed.  Past Surgical History:   Procedure Laterality Date     ENT SURGERY      tubes       Social History   I have reviewed this patient's social history and updated it with pertinent information if needed.  Social History     Tobacco Use     Smoking status: Current Every Day Smoker     Smokeless tobacco: " Never Used   Substance Use Topics     Alcohol use: Yes     Comment: daily variety     Drug use: Yes     Types: Marijuana       Family History     No significant family history, including no history of: HTN, Diabetes or cancer.     Medications   I have reviewed this patient's current medications  Current Facility-Administered Medications   Medication     alum & mag hydroxide-simethicone (MAALOX) suspension 30 mL     atenolol (TENORMIN) tablet 50 mg     folic acid (FOLVITE) tablet 1 mg     hydrOXYzine (ATARAX) tablet 25 mg     ibuprofen (ADVIL/MOTRIN) tablet 600 mg     loperamide (IMODIUM) capsule 2 mg     LORazepam (ATIVAN) tablet 1-4 mg     multivitamin w/minerals (THERA-VIT-M) tablet 1 tablet     nicotine polacrilex (NICORETTE) gum 4-8 mg     ondansetron (ZOFRAN-ODT) ODT tab 4 mg     senna-docusate (SENOKOT-S/PERICOLACE) 8.6-50 MG per tablet 1 tablet     thiamine (B-1) tablet 100 mg     traZODone (DESYREL) tablet 50 mg       Allergies   No Known Allergies    Physical Exam   Vital Signs: Temp: 97.8  F (36.6  C) Temp src: Temporal BP: 112/87 Pulse: 102   Resp: 16 SpO2: 97 % O2 Device: None (Room air)    Weight: 180 lbs 0 oz  GENERAL: Alert and oriented x 3. NAD. Pleasant and conversational   HEENT: Anicteric sclera. EOMI. Mucous membranes moist   CARDIOVASCULAR: Tachycardic rate, regular rhythm. S1, S2. No murmurs, rubs, or gallops.   RESPIRATORY: Effort normal on RA. Clear to auscultation bilaterally, no rales, rhonchi or wheezes, respirations unlabored   GI: Abdomen soft, non-tender abdomen without rebound or guarding, normoactive bowel sounds present  EXTREMITIES: No peripheral edema.  No calf asymmetry, erythema, or tenderness.   NEUROLOGICAL: No focal deficits. CN II-XII grossly intact. Moving all extremities symmetrically. Steady gait. Minimal tremor on extended arms.   SKIN: Intact. Warm and dry. No rashes or lesions on exposed skin of face and hands.  No jaundice.    Data   Results for orders placed or  performed during the hospital encounter of 02/19/21 (from the past 24 hour(s))   Alcohol breath test POCT   Result Value Ref Range    Alcohol Breath Test 0.000 0.00 - 0.01   CBC with platelets differential   Result Value Ref Range    WBC 6.0 4.0 - 11.0 10e9/L    RBC Count 3.76 (L) 4.4 - 5.9 10e12/L    Hemoglobin 12.8 (L) 13.3 - 17.7 g/dL    Hematocrit 36.1 (L) 40.0 - 53.0 %    MCV 96 78 - 100 fl    MCH 34.0 (H) 26.5 - 33.0 pg    MCHC 35.5 31.5 - 36.5 g/dL    RDW 14.5 10.0 - 15.0 %    Platelet Count 175 150 - 450 10e9/L    Diff Method Automated Method     % Neutrophils 74.9 %    % Lymphocytes 11.6 %    % Monocytes 11.4 %    % Eosinophils 0.3 %    % Basophils 1.0 %    % Immature Granulocytes 0.8 %    Nucleated RBCs 0 0 /100    Absolute Neutrophil 4.5 1.6 - 8.3 10e9/L    Absolute Lymphocytes 0.7 (L) 0.8 - 5.3 10e9/L    Absolute Monocytes 0.7 0.0 - 1.3 10e9/L    Absolute Eosinophils 0.0 0.0 - 0.7 10e9/L    Absolute Basophils 0.1 0.0 - 0.2 10e9/L    Abs Immature Granulocytes 0.1 0 - 0.4 10e9/L    Absolute Nucleated RBC 0.0    Comprehensive metabolic panel   Result Value Ref Range    Sodium 136 133 - 144 mmol/L    Potassium 3.6 3.4 - 5.3 mmol/L    Chloride 98 94 - 109 mmol/L    Carbon Dioxide 29 20 - 32 mmol/L    Anion Gap 9 3 - 14 mmol/L    Glucose 89 70 - 99 mg/dL    Urea Nitrogen 8 7 - 30 mg/dL    Creatinine 0.91 0.66 - 1.25 mg/dL    GFR Estimate >90 >60 mL/min/[1.73_m2]    GFR Estimate If Black >90 >60 mL/min/[1.73_m2]    Calcium 9.2 8.5 - 10.1 mg/dL    Bilirubin Total 1.4 (H) 0.2 - 1.3 mg/dL    Albumin 4.3 3.4 - 5.0 g/dL    Protein Total 8.2 6.8 - 8.8 g/dL    Alkaline Phosphatase 184 (H) 40 - 150 U/L     (H) 0 - 70 U/L     (H) 0 - 45 U/L   Magnesium   Result Value Ref Range    Magnesium 1.9 1.6 - 2.3 mg/dL   Drug abuse screen 6 urine (chem dep)   Result Value Ref Range    Amphetamine Qual Urine Negative NEG^Negative    Barbiturates Qual Urine Negative NEG^Negative    Benzodiazepine Qual Urine Negative  NEG^Negative    Cannabinoids Qual Urine Positive (A) NEG^Negative    Cocaine Qual Urine Positive (A) NEG^Negative    Ethanol Qual Urine Negative NEG^Negative    Opiates Qualitative Urine Negative NEG^Negative   Asymptomatic SARS-CoV-2 COVID-19 Virus (Coronavirus) by PCR    Specimen: Nasopharyngeal   Result Value Ref Range    SARS-CoV-2 Virus Specimen Source Nasopharyngeal     SARS-CoV-2 PCR Result NEGATIVE     SARS-CoV-2 PCR Comment (Note)    GGT   Result Value Ref Range    GGT 1,863 (H) 0 - 75 U/L   Lipid panel   Result Value Ref Range    Cholesterol 277 (H) <200 mg/dL    Triglycerides 92 <150 mg/dL    HDL Cholesterol 95 >39 mg/dL    LDL Cholesterol Calculated 164 (H) <100 mg/dL    Non HDL Cholesterol 182 (H) <130 mg/dL

## 2021-02-21 VITALS
DIASTOLIC BLOOD PRESSURE: 88 MMHG | BODY MASS INDEX: 25.77 KG/M2 | OXYGEN SATURATION: 98 % | WEIGHT: 180 LBS | HEIGHT: 70 IN | RESPIRATION RATE: 16 BRPM | SYSTOLIC BLOOD PRESSURE: 116 MMHG | TEMPERATURE: 98.2 F | HEART RATE: 112 BPM

## 2021-02-21 LAB
ALBUMIN SERPL-MCNC: 3.6 G/DL (ref 3.4–5)
ALP SERPL-CCNC: 141 U/L (ref 40–150)
ALT SERPL W P-5'-P-CCNC: 109 U/L (ref 0–70)
AST SERPL W P-5'-P-CCNC: 181 U/L (ref 0–45)
BILIRUB DIRECT SERPL-MCNC: 0.4 MG/DL (ref 0–0.2)
BILIRUB SERPL-MCNC: 0.9 MG/DL (ref 0.2–1.3)
HBV SURFACE AB SERPL IA-ACNC: 1.19 M[IU]/ML
HBV SURFACE AG SERPL QL IA: NONREACTIVE
HCT VFR BLD AUTO: 37 % (ref 40–53)
HCV AB SERPL QL IA: NONREACTIVE
HGB BLD-MCNC: 13.1 G/DL (ref 13.3–17.7)
HIV 1+2 AB+HIV1 P24 AG SERPL QL IA: NONREACTIVE
PROT SERPL-MCNC: 7.2 G/DL (ref 6.8–8.8)

## 2021-02-21 PROCEDURE — 85014 HEMATOCRIT: CPT | Performed by: PHYSICIAN ASSISTANT

## 2021-02-21 PROCEDURE — 36415 COLL VENOUS BLD VENIPUNCTURE: CPT | Performed by: PHYSICIAN ASSISTANT

## 2021-02-21 PROCEDURE — 85018 HEMOGLOBIN: CPT | Performed by: PHYSICIAN ASSISTANT

## 2021-02-21 PROCEDURE — H2032 ACTIVITY THERAPY, PER 15 MIN: HCPCS

## 2021-02-21 PROCEDURE — 99238 HOSP IP/OBS DSCHRG MGMT 30/<: CPT | Performed by: NURSE PRACTITIONER

## 2021-02-21 PROCEDURE — 80076 HEPATIC FUNCTION PANEL: CPT | Performed by: PHYSICIAN ASSISTANT

## 2021-02-21 PROCEDURE — 250N000013 HC RX MED GY IP 250 OP 250 PS 637: Performed by: NURSE PRACTITIONER

## 2021-02-21 RX ORDER — MULTIPLE VITAMINS W/ MINERALS TAB 9MG-400MCG
1 TAB ORAL DAILY
Qty: 30 TABLET | Refills: 0 | Status: SHIPPED | OUTPATIENT
Start: 2021-02-22 | End: 2021-03-24

## 2021-02-21 RX ORDER — LANOLIN ALCOHOL/MO/W.PET/CERES
100 CREAM (GRAM) TOPICAL DAILY
Qty: 30 TABLET | Refills: 0 | Status: SHIPPED | OUTPATIENT
Start: 2021-02-22 | End: 2021-03-24

## 2021-02-21 RX ORDER — HYDROXYZINE HYDROCHLORIDE 25 MG/1
25 TABLET, FILM COATED ORAL 3 TIMES DAILY PRN
Qty: 90 TABLET | Refills: 0 | Status: SHIPPED | OUTPATIENT
Start: 2021-02-21 | End: 2021-03-23

## 2021-02-21 RX ORDER — FOLIC ACID 1 MG/1
1 TABLET ORAL DAILY
Qty: 30 TABLET | Refills: 0 | Status: SHIPPED | OUTPATIENT
Start: 2021-02-22 | End: 2021-03-24

## 2021-02-21 RX ADMIN — THIAMINE HCL TAB 100 MG 100 MG: 100 TAB at 08:15

## 2021-02-21 RX ADMIN — MULTIPLE VITAMINS W/ MINERALS TAB 1 TABLET: TAB at 08:15

## 2021-02-21 RX ADMIN — FOLIC ACID 1 MG: 1 TABLET ORAL at 08:15

## 2021-02-21 RX ADMIN — HYDROXYZINE HYDROCHLORIDE 25 MG: 25 TABLET, FILM COATED ORAL at 08:15

## 2021-02-21 RX ADMIN — NICOTINE POLACRILEX 8 MG: 4 GUM, CHEWING BUCCAL at 18:14

## 2021-02-21 RX ADMIN — NICOTINE POLACRILEX 8 MG: 4 GUM, CHEWING BUCCAL at 14:24

## 2021-02-21 RX ADMIN — NICOTINE POLACRILEX 8 MG: 4 GUM, CHEWING BUCCAL at 12:15

## 2021-02-21 RX ADMIN — NICOTINE POLACRILEX 8 MG: 4 GUM, CHEWING BUCCAL at 08:16

## 2021-02-21 NOTE — PLAN OF CARE
S:  Lobo has been visible in the milieu watching TV with his peers.  He did not participated in Art group.  He is eating and drinking normally.  He denies any thoughts of self harm, suicide or thoughts of harming others.  He denies having any depression or anxiety.  He called his mood inspired.      He remains slightly tachycardic and mildly tremulous. He is a bit restless, up walking in the [place] occasionally.  At the 20:00 check he inquired when writer thought he would be discharged.  Writer stated that it usually takes about three days to detox and it's possible that he could be out of detox by Monday, making him eligible for discharge then.  He completed his CM paper work.  B: Pt admitted for alcohol withdrawal and detoxification.  A:  Pt in moderate alcohol withdrawal AEB MSSA scores of 9 & 10.  R:  Administered lorazepam 1 mg and 2 mg at 16:18 and 20:16 respectively, and two pieces of nicotine gum X 2.  Continue to monitor and medicate as ordered and indicated.

## 2021-02-21 NOTE — DISCHARGE SUMMARY
Psychiatric Discharge Summary    Rickey L Brunner MRN# [id number]   Age: 30 year old YOB: 1990     Date of Admission:  2/19/2021  Date of Discharge:  2/21/2021  Admitting Provider:  Jairon Berger MD  Discharge Provider:  TAMAR Lion CNP         Event Leading to Hospitalization:   The patient has a history of alcohol usage who presented to the emergency room reporting concern for emerging alcohol withdrawal symptoms.  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  He recently noticed that he would wake in the morning feeling tremulous and nauseous and the symptoms would resolve when drinking would resume.  In the emergency room, his alcohol breath test was 0 however his transaminases were fairly elevated.  On examination today, the patient reports casual and social usage of alcohol for a few years which recently worsened during the COVID pandemic.  He explains that needing to isolate at home for 3 months period, introduced boredom leading to increased alcohol use.  He was recently allowed to return back to work however he continued to consume alcohol in larger quantities, eventually realizing withdrawal symptoms, and seeking assistance.  He denied any contributing psychosocial stressors. His treatment goals are to detox from alcohol then seek sobriety support through AA.       See Admission note by Alonzo Bell MD, on 2/20/2021 for additional details.          DIagnoses:   Alcohol use disorder, severe  Alcohol withdrawal. Compleated  Generalized anxiety disorder  Cannabis use disorder, mild  Nicotine use disorder, mild  Transaminitis, managed by internal medicine.          Labs:     Recent Results (from the past 168 hour(s))   Alcohol breath test POCT    Collection Time: 02/19/21 10:28 AM   Result Value Ref Range    Alcohol Breath Test 0.000 0.00 - 0.01   CBC with platelets differential     Collection Time: 02/19/21 11:03 AM   Result Value Ref Range    WBC 6.0 4.0 - 11.0 10e9/L    RBC Count 3.76 (L) 4.4 - 5.9 10e12/L    Hemoglobin 12.8 (L) 13.3 - 17.7 g/dL    Hematocrit 36.1 (L) 40.0 - 53.0 %    MCV 96 78 - 100 fl    MCH 34.0 (H) 26.5 - 33.0 pg    MCHC 35.5 31.5 - 36.5 g/dL    RDW 14.5 10.0 - 15.0 %    Platelet Count 175 150 - 450 10e9/L    Diff Method Automated Method     % Neutrophils 74.9 %    % Lymphocytes 11.6 %    % Monocytes 11.4 %    % Eosinophils 0.3 %    % Basophils 1.0 %    % Immature Granulocytes 0.8 %    Nucleated RBCs 0 0 /100    Absolute Neutrophil 4.5 1.6 - 8.3 10e9/L    Absolute Lymphocytes 0.7 (L) 0.8 - 5.3 10e9/L    Absolute Monocytes 0.7 0.0 - 1.3 10e9/L    Absolute Eosinophils 0.0 0.0 - 0.7 10e9/L    Absolute Basophils 0.1 0.0 - 0.2 10e9/L    Abs Immature Granulocytes 0.1 0 - 0.4 10e9/L    Absolute Nucleated RBC 0.0    Comprehensive metabolic panel    Collection Time: 02/19/21 11:03 AM   Result Value Ref Range    Sodium 136 133 - 144 mmol/L    Potassium 3.6 3.4 - 5.3 mmol/L    Chloride 98 94 - 109 mmol/L    Carbon Dioxide 29 20 - 32 mmol/L    Anion Gap 9 3 - 14 mmol/L    Glucose 89 70 - 99 mg/dL    Urea Nitrogen 8 7 - 30 mg/dL    Creatinine 0.91 0.66 - 1.25 mg/dL    GFR Estimate >90 >60 mL/min/[1.73_m2]    GFR Estimate If Black >90 >60 mL/min/[1.73_m2]    Calcium 9.2 8.5 - 10.1 mg/dL    Bilirubin Total 1.4 (H) 0.2 - 1.3 mg/dL    Albumin 4.3 3.4 - 5.0 g/dL    Protein Total 8.2 6.8 - 8.8 g/dL    Alkaline Phosphatase 184 (H) 40 - 150 U/L     (H) 0 - 70 U/L     (H) 0 - 45 U/L   Magnesium    Collection Time: 02/19/21 11:03 AM   Result Value Ref Range    Magnesium 1.9 1.6 - 2.3 mg/dL   Drug abuse screen 6 urine (chem dep)    Collection Time: 02/19/21 12:40 PM   Result Value Ref Range    Amphetamine Qual Urine Negative NEG^Negative    Barbiturates Qual Urine Negative NEG^Negative    Benzodiazepine Qual Urine Negative NEG^Negative    Cannabinoids Qual Urine Positive (A)  NEG^Negative    Cocaine Qual Urine Positive (A) NEG^Negative    Ethanol Qual Urine Negative NEG^Negative    Opiates Qualitative Urine Negative NEG^Negative   Asymptomatic SARS-CoV-2 COVID-19 Virus (Coronavirus) by PCR    Collection Time: 02/19/21  2:35 PM    Specimen: Nasopharyngeal   Result Value Ref Range    SARS-CoV-2 Virus Specimen Source Nasopharyngeal     SARS-CoV-2 PCR Result NEGATIVE     SARS-CoV-2 PCR Comment (Note)    GGT    Collection Time: 02/20/21  6:26 AM   Result Value Ref Range    GGT 1,863 (H) 0 - 75 U/L   Lipid panel    Collection Time: 02/20/21  6:26 AM   Result Value Ref Range    Cholesterol 277 (H) <200 mg/dL    Triglycerides 92 <150 mg/dL    HDL Cholesterol 95 >39 mg/dL    LDL Cholesterol Calculated 164 (H) <100 mg/dL    Non HDL Cholesterol 182 (H) <130 mg/dL   HIV Antigen Antibody Combo    Collection Time: 02/20/21  6:26 AM   Result Value Ref Range    HIV Antigen Antibody Combo Nonreactive NR^Nonreactive       Hepatitis B surface antigen    Collection Time: 02/20/21 10:10 AM   Result Value Ref Range    Hep B Surface Agn Nonreactive NR^Nonreactive   Hepatitis C antibody    Collection Time: 02/20/21 10:10 AM   Result Value Ref Range    Hepatitis C Antibody Nonreactive NR^Nonreactive   Hepatitis B Surface Antibody    Collection Time: 02/20/21 10:10 AM   Result Value Ref Range    Hepatitis B Surface Antibody 1.19 <8.00 m[IU]/mL   Iron and iron binding capacity    Collection Time: 02/20/21 10:10 AM   Result Value Ref Range    Iron 90 35 - 180 ug/dL    Iron Binding Cap 170 (L) 240 - 430 ug/dL    Iron Saturation Index 53 (H) 15 - 46 %   Ferritin    Collection Time: 02/20/21 10:10 AM   Result Value Ref Range    Ferritin 3,555 (H) 26 - 388 ng/mL   Vitamin B12    Collection Time: 02/20/21 10:10 AM   Result Value Ref Range    Vitamin B12 421 193 - 986 pg/mL   Folate    Collection Time: 02/20/21 10:10 AM   Result Value Ref Range    Folate 58.0 >5.4 ng/mL   Hepatic panel    Collection Time: 02/21/21  7:13  AM   Result Value Ref Range    Bilirubin Direct 0.4 (H) 0.0 - 0.2 mg/dL    Bilirubin Total 0.9 0.2 - 1.3 mg/dL    Albumin 3.6 3.4 - 5.0 g/dL    Protein Total 7.2 6.8 - 8.8 g/dL    Alkaline Phosphatase 141 40 - 150 U/L     (H) 0 - 70 U/L     (H) 0 - 45 U/L   Hemoglobin and hematocrit    Collection Time: 02/21/21  7:13 AM   Result Value Ref Range    Hemoglobin 13.1 (L) 13.3 - 17.7 g/dL    Hematocrit 37.0 (L) 40.0 - 53.0 %              Consults:   Consultation during this admission received from internal medicine         Hospital Course:   Rickey L Brunner was admitted to Station 3A. The patient was placed under status 15 (15 minute checks) to ensure patient safety.     The following medication changes took place: Folic acid, multivitamins, thiamine, and as needed hydroxyzine.  The patient tolerated medications well.  He completed detox uneventfully.  Denies cravings for alcohol.  He wants to discharge today because he is working tomorrow.  The patient denies depression.  Denies suicidal ideation.  Anxiety is better with hydroxyzine.  Reports anxiety increased due to worries about having HIV.  States having an HIV test was the reason he went to the hospital.  The patient is eating well and taking his medications as prescribed.  He slept well. Attended unit programs. There were no behavioral issues during this hospitalization.    Rickey L Brunner was released to home. At the time of this encounter, Rickey L Brunner was determined to not be a danger to himself or others and symptoms did not meet criteria for involuntary hospitalization.      Safety plan, post discharge recommendations and relapse prevention were discussed with the patient. The patient agreed to call 911 or present to ED if symptoms worsen or developed thoughts of suicide, self harm or homicide.  The patient agreed to continue medications and outpatient care.         Discharge Medications:     Current Discharge Medication List      START  taking these medications    Details   folic acid (FOLVITE) 1 MG tablet Take 1 tablet (1 mg) by mouth daily  Qty: 30 tablet, Refills: 0    Associated Diagnoses: Alcohol withdrawal syndrome without complication (H)      hydrOXYzine (ATARAX) 25 MG tablet Take 1 tablet (25 mg) by mouth 3 times daily as needed for anxiety  Qty: 90 tablet, Refills: 0    Associated Diagnoses: Alcohol withdrawal syndrome without complication (H)      multivitamin w/minerals (THERA-VIT-M) tablet Take 1 tablet by mouth daily  Qty: 30 tablet, Refills: 0    Associated Diagnoses: Alcohol withdrawal syndrome without complication (H)      thiamine (B-1) 100 MG tablet Take 1 tablet (100 mg) by mouth daily  Qty: 30 tablet, Refills: 0    Associated Diagnoses: Alcohol withdrawal syndrome without complication (H)         CONTINUE these medications which have NOT CHANGED    Details   ibuprofen (ADVIL/MOTRIN) 200 MG tablet Take 400 mg by mouth every 4 hours as needed for mild pain                  Psychiatric and Physical Examinations:   Appearance:  well groomed, awake, alert and cooperative  Attitude:  cooperative  Eye Contact:  good  Mood:  anxious and better  Affect:  appropriate and in normal range  Speech:  clear, coherent  Psychomotor Behavior:  no evidence of tardive dyskinesia, dystonia, or tics  Thought Process:  logical and goal oriented  Associations:  no loose associations  Thought Content:  no evidence of suicidal ideation or homicidal ideation  Insight:  good  Judgment:  intact  Oriented to:  time, person, and place  Attention Span and Concentration:  intact  Recent and Remote Memory:  intact  Language and Fund of Knowledge: appropriate  Muscle Strength and Tone: normal  Gait and Station: Normal  Vitals:    02/20/21 2300 02/21/21 0500 02/21/21 0739 02/21/21 1135   BP: 100/67 106/71 124/88 114/83   BP Location: Left arm Left arm     Pulse: 89 92 97 103   Resp: 14 14 16 16   Temp: 98.3  F (36.8  C) 98.1  F (36.7  C) 98.1  F (36.7  C) 98.2  F  (36.8  C)   TempSrc: Temporal Temporal Temporal Temporal   SpO2: 100% 99% 98%    Weight:       Height:                Discharge Plan:     Follow up Appointment:  The patient will make his own appointment.    Attestation:  The patient has been seen and evaluated by me,  Alysha BOYLE, CNP

## 2021-02-21 NOTE — PROGRESS NOTES
Patient had MSSA scores of 5 and 6 and required no PRN per withdrawal protocol. Observed sleeping throughout the night. Vital signs within desired limits. No problems identified.

## 2021-02-21 NOTE — PROGRESS NOTES
Brief Medicine Note    Following up on lab results. Repeat LFTs are down trending. H/H repeated and stable. Iron normal 90, TIBC low 170, and elevated ferritin 3555. Normal B12 and folate. HIV non-reactive. Hepatitis C ab non-reactive. Hepatitis B surface agn non-reactive with hepatitis B surface ab 1.19. Patient updated on results, and recommend that he get Hep B immunized with PCP.     ROUTINE IP LABS (Last four results)  CMP   Recent Labs   Lab 02/21/21  0713 02/19/21  1103   NA  --  136   POTASSIUM  --  3.6   CHLORIDE  --  98   CO2  --  29   ANIONGAP  --  9   GLC  --  89   BUN  --  8   CR  --  0.91   JOEL  --  9.2   MAG  --  1.9   PROTTOTAL 7.2 8.2   ALBUMIN 3.6 4.3   BILITOTAL 0.9 1.4*   ALKPHOS 141 184*   * 487*   * 166*     CBC   Recent Labs   Lab 02/21/21  0713 02/19/21  1103   WBC  --  6.0   RBC  --  3.76*   HGB 13.1* 12.8*   HCT 37.0* 36.1*   MCV  --  96   MCH  --  34.0*   MCHC  --  35.5   RDW  --  14.5   PLT  --  175     Medicine will sign off.  No further recommendations at this time. Please page the on-call TIANA for any intercurrent medical issues which arise.     Gisel Garibay PA-C  Hospitalist Service  Contact information available via [facility] Paging/Directory

## 2021-02-22 NOTE — DISCHARGE INSTRUCTIONS
Behavioral Discharge Planning and Instructions  THANK YOU FOR CHOOSING THE [address]  [phone]    Summary: You were admitted to Station 3A on 2/19/21 for detoxification from alcohol.  A medical exam was performed that included lab work. A copy of the results was provided for you.  You were given the option of meeting with a  and opted to discharge prior to meeting.  Please take care and make your recovery a priority, Lobo.     Recommendation: You have elected to discharge prior to meeting with the Attending Psychiatrist and therefore no recommendation is available.    Main Diagnosis: Alcohol use disorder, severe  Alcohol withdrawal. Compleated  Generalized anxiety disorder  Cannabis use disorder, mild  Nicotine use disorder, mild  Transaminitis, managed by internal medicine.     Major Treatments, Procedures and Findings:  You have withdrawn from alcohol using ativan.  You have had labs drawn and those results have been reviewed with you.  Please take a copy of your lab work with you to your next primary care physician appointment.    Symptoms to Report:  If you experience more anxiety, confusion, sleeplessness, deep sadness or thoughts of suicide, notify your treatment team or notify your primary care physician. IF ANY OF THE SYMPTOMS YOU ARE EXPERIENCING ARE A MEDICAL EMERGENCY CALL 911 IMMEDIATELY.     Lifestyle Adjustment: Adjust your lifestyle to get enough sleep, relaxation, exercise and  good nutrition. Continue to develop healthy coping skills to decrease stress and promote a sober living environment. Do not use alcohol, illegal drugs or addictive medications other than what is currently prescribed. AA, NA, and  Sponsor are excellent resources for support.     Primary Provider:    Pt will make follow appointment from home.    Psychiatry Follow-up:   Appointment Date/Time:    Psychiatrist/Primary Care Giver:    Address:    Phone Number:     Appointment Date/Time:     Therapist:    Address:    Phone Number:     Appointment Date/Time:    Support Group:    Address:    Phone Number:     Appointment Date/Time:    Other:    Address:    Phone Number:     If no appointments scheduled, explain     Resources:     [facility] [phone]    Support Group:  AA/NA and Sponsor/support    Crisis Intervention: 100.592.9634 or 219-797-1625 (TTY: 598.391.1840).  Call anytime for help.  National [place] on Mental Illness (www.mn.anthony.org): 959.194.1841 or 064-829-1708.  Alcoholics Anonymous (www.alcoholics-anonymous.org): Check your phone book for your local chapter.  Suicide Awareness Voices of Education (SAVE) (www.save.org): 412-520-AZTG (2698)  National Suicide Prevention Line (www.mentalhealthmn.org): 319-968-ZFBU (1163)  Mental Health Consumer/Survivor Network of [place] (www.mhcsn.net): 516.991.3903 or 940-025-9194  Mental Health Association of [place] (www.mentalhealth.org): 514.746.5260 or 703-365-0128   Substance Abuse and Mental Health Services (www.samhsa.gov)    [place] [facility] ([facility])  [facility] connects people seeking recovery to resources that help foster and sustain long-term recovery.  Whether you are seeking resources for treatment, transportation, housing, job training, education, health care or other pathways to recovery, [facility] is a great place to start.  825.157.1871.  Www.[place]arecovery.org    General Medication Instructions:   See your medication sheet(s) for instructions.   Take all medicines as directed.  Make no changes unless your doctor suggests them.   Go to all your doctor visits.  Be sure to have all your required lab tests. This way, your medicines can be refilled on time.  Do not use any drugs not prescribed by your provider.  AA/NA and Sponsors are excellent resources for support  Avoid alcohol.    Please Note:  If you have any questions at anytime after you are discharged please call the [facility] [place] detox unit  3AW unit at [phone].    [facility], Behavioral Intake [phone]    Please take this discharge folder with you to all your follow up appointments, it contains your lab results, diagnosis, medication list and discharge recommendations.      THANK YOU FOR CHOOSING THE [facility]

## 2021-02-22 NOTE — PLAN OF CARE
S:  Lobo has not required medication for alcohol withdrawal for more than 24  hours.  His MSSA scores have remained below eight for more than 24 hours.  He denies having any thoughts of self harm, suicide or thoughts of harming others.  He identified his coping mechanisms and his support systems.  He received his discharge instructions and stated that he had no further questions. He signed the document stating so.  He was given form letter stating that he had been hospitalized under a doctor's care from 2/19/21-2/21/21 and signed by this writer to bring to his work.  B:  Pt admitted for alcohol withdrawal and detoxification.  A:  Pt medically stable in the detox process AEB MSSA scores 6 & 6.  R: Pt encouraged to make a follow up appointment with a PCP to have his liver functions re-evaluated.  He was given his belongings and his discharge medications.  He signed for these and was escorted off the unit at 20:10, where his Mother was waiting to pick him up outside at the Emergency room entrance.

## 2021-02-22 NOTE — PLAN OF CARE
Problem: General Rehab Plan of Care  Goal: Therapeutic Recreation/Music Therapy Goal (Art Therapy)  Description: The patient and/or their representative will achieve their patient-specific goals related to the plan of care.  The patient-specific goals include: emotional expression  Outcome: No Change   Art Therapy directive was to create a watercolor mandala using suggested mindfulness techniques and by creating a personal intention for the day and/or week. Goals of directive: mindfulness, emotional expression, emotional regulation, trauma containment, media exploration.  Pt was an active participant, focused on task for the full duration of group (1 hour). Pt finished painting and briefly shared with group. Pts personal intention for mandala was: spirituality/expression of spirituality  Pts mood was calm, pleasant participant.

## 2021-08-27 ENCOUNTER — HOSPITAL ENCOUNTER (INPATIENT)
Facility: CLINIC | Age: 31
LOS: 1 days | Discharge: HOME | DRG: 897 | End: 2021-08-29
Attending: EMERGENCY MEDICINE | Admitting: PSYCHIATRY & NEUROLOGY

## 2021-08-27 ENCOUNTER — TELEPHONE (OUTPATIENT)
Dept: BEHAVIORAL HEALTH | Facility: CLINIC | Age: 31
End: 2021-08-27

## 2021-08-27 DIAGNOSIS — Z11.52 ENCOUNTER FOR SCREENING LABORATORY TESTING FOR SEVERE ACUTE RESPIRATORY SYNDROME CORONAVIRUS 2 (SARS-COV-2): ICD-10-CM

## 2021-08-27 DIAGNOSIS — F41.9 ANXIETY: Primary | ICD-10-CM

## 2021-08-27 DIAGNOSIS — F10.10 ALCOHOL ABUSE: ICD-10-CM

## 2021-08-27 LAB
ALBUMIN SERPL-MCNC: 4 G/DL (ref 3.4–5)
ALCOHOL BREATH TEST: 0.24 (ref 0–0.01)
ALP SERPL-CCNC: 102 U/L (ref 40–150)
ALT SERPL W P-5'-P-CCNC: 48 U/L (ref 0–70)
ANION GAP SERPL CALCULATED.3IONS-SCNC: 6 MMOL/L (ref 3–14)
AST SERPL W P-5'-P-CCNC: 40 U/L (ref 0–45)
BASOPHILS # BLD AUTO: 0.1 10E3/UL (ref 0–0.2)
BASOPHILS NFR BLD AUTO: 1 %
BILIRUB SERPL-MCNC: 0.3 MG/DL (ref 0.2–1.3)
BUN SERPL-MCNC: 15 MG/DL (ref 7–30)
CALCIUM SERPL-MCNC: 8.3 MG/DL (ref 8.5–10.1)
CHLORIDE BLD-SCNC: 108 MMOL/L (ref 94–109)
CO2 SERPL-SCNC: 26 MMOL/L (ref 20–32)
CREAT SERPL-MCNC: 0.99 MG/DL (ref 0.66–1.25)
EOSINOPHIL # BLD AUTO: 0.2 10E3/UL (ref 0–0.7)
EOSINOPHIL NFR BLD AUTO: 3 %
ERYTHROCYTE [DISTWIDTH] IN BLOOD BY AUTOMATED COUNT: 11.9 % (ref 10–15)
GFR SERPL CREATININE-BSD FRML MDRD: >90 ML/MIN/1.73M2
GLUCOSE BLD-MCNC: 128 MG/DL (ref 70–99)
HCT VFR BLD AUTO: 44.2 % (ref 40–53)
HGB BLD-MCNC: 15.7 G/DL (ref 13.3–17.7)
IMM GRANULOCYTES # BLD: 0 10E3/UL
IMM GRANULOCYTES NFR BLD: 0 %
LYMPHOCYTES # BLD AUTO: 3.5 10E3/UL (ref 0.8–5.3)
LYMPHOCYTES NFR BLD AUTO: 44 %
MAGNESIUM SERPL-MCNC: 2.2 MG/DL (ref 1.6–2.3)
MCH RBC QN AUTO: 32.3 PG (ref 26.5–33)
MCHC RBC AUTO-ENTMCNC: 35.5 G/DL (ref 31.5–36.5)
MCV RBC AUTO: 91 FL (ref 78–100)
MONOCYTES # BLD AUTO: 0.7 10E3/UL (ref 0–1.3)
MONOCYTES NFR BLD AUTO: 9 %
NEUTROPHILS # BLD AUTO: 3.4 10E3/UL (ref 1.6–8.3)
NEUTROPHILS NFR BLD AUTO: 43 %
NRBC # BLD AUTO: 0 10E3/UL
NRBC BLD AUTO-RTO: 0 /100
PLATELET # BLD AUTO: 356 10E3/UL (ref 150–450)
POTASSIUM BLD-SCNC: 3.7 MMOL/L (ref 3.4–5.3)
PROT SERPL-MCNC: 8 G/DL (ref 6.8–8.8)
RBC # BLD AUTO: 4.86 10E6/UL (ref 4.4–5.9)
SODIUM SERPL-SCNC: 140 MMOL/L (ref 133–144)
WBC # BLD AUTO: 8 10E3/UL (ref 4–11)

## 2021-08-27 PROCEDURE — 250N000013 HC RX MED GY IP 250 OP 250 PS 637: Performed by: EMERGENCY MEDICINE

## 2021-08-27 PROCEDURE — 85004 AUTOMATED DIFF WBC COUNT: CPT | Performed by: EMERGENCY MEDICINE

## 2021-08-27 PROCEDURE — 99284 EMERGENCY DEPT VISIT MOD MDM: CPT | Performed by: EMERGENCY MEDICINE

## 2021-08-27 PROCEDURE — 80053 COMPREHEN METABOLIC PANEL: CPT | Performed by: EMERGENCY MEDICINE

## 2021-08-27 PROCEDURE — C9803 HOPD COVID-19 SPEC COLLECT: HCPCS | Performed by: EMERGENCY MEDICINE

## 2021-08-27 PROCEDURE — 83735 ASSAY OF MAGNESIUM: CPT | Performed by: EMERGENCY MEDICINE

## 2021-08-27 PROCEDURE — 36415 COLL VENOUS BLD VENIPUNCTURE: CPT | Performed by: EMERGENCY MEDICINE

## 2021-08-27 PROCEDURE — 99285 EMERGENCY DEPT VISIT HI MDM: CPT | Performed by: EMERGENCY MEDICINE

## 2021-08-27 RX ORDER — FOLIC ACID 1 MG/1
1 TABLET ORAL ONCE
Status: COMPLETED | OUTPATIENT
Start: 2021-08-27 | End: 2021-08-27

## 2021-08-27 RX ORDER — MULTIVITAMIN,THERAPEUTIC
1 TABLET ORAL ONCE
Status: COMPLETED | OUTPATIENT
Start: 2021-08-27 | End: 2021-08-27

## 2021-08-27 RX ORDER — LANOLIN ALCOHOL/MO/W.PET/CERES
100 CREAM (GRAM) TOPICAL ONCE
Status: COMPLETED | OUTPATIENT
Start: 2021-08-27 | End: 2021-08-27

## 2021-08-27 RX ADMIN — THIAMINE HCL TAB 100 MG 100 MG: 100 TAB at 22:25

## 2021-08-27 RX ADMIN — FOLIC ACID 1 MG: 1 TABLET ORAL at 22:25

## 2021-08-27 RX ADMIN — NICOTINE POLACRILEX 2 MG: 2 GUM, CHEWING BUCCAL at 22:26

## 2021-08-27 RX ADMIN — THERA TABS 1 TABLET: TAB at 22:26

## 2021-08-28 PROBLEM — F10.10 ALCOHOL ABUSE: Status: ACTIVE | Noted: 2021-08-28

## 2021-08-28 LAB
AMPHETAMINES UR QL SCN: ABNORMAL
BARBITURATES UR QL: ABNORMAL
BENZODIAZ UR QL: ABNORMAL
CANNABINOIDS UR QL SCN: ABNORMAL
COCAINE UR QL: ABNORMAL
OPIATES UR QL SCN: ABNORMAL
SARS-COV-2 RNA RESP QL NAA+PROBE: NEGATIVE

## 2021-08-28 PROCEDURE — HZ2ZZZZ DETOXIFICATION SERVICES FOR SUBSTANCE ABUSE TREATMENT: ICD-10-PCS | Performed by: PSYCHIATRY & NEUROLOGY

## 2021-08-28 PROCEDURE — 128N000004 HC R&B CD ADULT

## 2021-08-28 PROCEDURE — 99207 PR CONSULT E&M CHANGED TO SUBSEQUENT LEVEL: CPT | Performed by: NURSE PRACTITIONER

## 2021-08-28 PROCEDURE — 250N000013 HC RX MED GY IP 250 OP 250 PS 637: Performed by: EMERGENCY MEDICINE

## 2021-08-28 PROCEDURE — 250N000011 HC RX IP 250 OP 636: Performed by: PHYSICIAN ASSISTANT

## 2021-08-28 PROCEDURE — 80307 DRUG TEST PRSMV CHEM ANLYZR: CPT | Performed by: EMERGENCY MEDICINE

## 2021-08-28 PROCEDURE — 99207 PR CDG-MDM COMPONENT: MEETS LOW - DOWN CODED: CPT | Performed by: CLINICAL NURSE SPECIALIST

## 2021-08-28 PROCEDURE — H2032 ACTIVITY THERAPY, PER 15 MIN: HCPCS

## 2021-08-28 PROCEDURE — 99231 SBSQ HOSP IP/OBS SF/LOW 25: CPT | Performed by: NURSE PRACTITIONER

## 2021-08-28 PROCEDURE — 99221 1ST HOSP IP/OBS SF/LOW 40: CPT | Mod: AI | Performed by: CLINICAL NURSE SPECIALIST

## 2021-08-28 PROCEDURE — 250N000013 HC RX MED GY IP 250 OP 250 PS 637: Performed by: CLINICAL NURSE SPECIALIST

## 2021-08-28 PROCEDURE — U0003 INFECTIOUS AGENT DETECTION BY NUCLEIC ACID (DNA OR RNA); SEVERE ACUTE RESPIRATORY SYNDROME CORONAVIRUS 2 (SARS-COV-2) (CORONAVIRUS DISEASE [COVID-19]), AMPLIFIED PROBE TECHNIQUE, MAKING USE OF HIGH THROUGHPUT TECHNOLOGIES AS DESCRIBED BY CMS-2020-01-R: HCPCS | Performed by: EMERGENCY MEDICINE

## 2021-08-28 PROCEDURE — 250N000013 HC RX MED GY IP 250 OP 250 PS 637: Performed by: PSYCHIATRY & NEUROLOGY

## 2021-08-28 RX ORDER — ATENOLOL 50 MG/1
50 TABLET ORAL DAILY PRN
Status: DISCONTINUED | OUTPATIENT
Start: 2021-08-28 | End: 2021-08-29 | Stop reason: HOSPADM

## 2021-08-28 RX ORDER — FOLIC ACID 1 MG/1
1 TABLET ORAL DAILY
Status: DISCONTINUED | OUTPATIENT
Start: 2021-08-28 | End: 2021-08-29 | Stop reason: HOSPADM

## 2021-08-28 RX ORDER — ONDANSETRON 4 MG/1
4 TABLET, ORALLY DISINTEGRATING ORAL EVERY 6 HOURS PRN
Status: DISCONTINUED | OUTPATIENT
Start: 2021-08-28 | End: 2021-08-29 | Stop reason: HOSPADM

## 2021-08-28 RX ORDER — DIAZEPAM 5 MG
5-20 TABLET ORAL EVERY 30 MIN PRN
Status: DISCONTINUED | OUTPATIENT
Start: 2021-08-28 | End: 2021-08-29

## 2021-08-28 RX ORDER — MULTIPLE VITAMINS W/ MINERALS TAB 9MG-400MCG
1 TAB ORAL DAILY
Status: DISCONTINUED | OUTPATIENT
Start: 2021-08-28 | End: 2021-08-29 | Stop reason: HOSPADM

## 2021-08-28 RX ORDER — LANOLIN ALCOHOL/MO/W.PET/CERES
100 CREAM (GRAM) TOPICAL DAILY
Status: DISCONTINUED | OUTPATIENT
Start: 2021-08-28 | End: 2021-08-29 | Stop reason: HOSPADM

## 2021-08-28 RX ORDER — HYDROXYZINE HYDROCHLORIDE 25 MG/1
25-50 TABLET, FILM COATED ORAL EVERY 4 HOURS PRN
Status: DISCONTINUED | OUTPATIENT
Start: 2021-08-28 | End: 2021-08-29 | Stop reason: HOSPADM

## 2021-08-28 RX ADMIN — NICOTINE POLACRILEX 2 MG: 2 GUM, CHEWING ORAL at 16:12

## 2021-08-28 RX ADMIN — HYDROXYZINE HYDROCHLORIDE 50 MG: 25 TABLET, FILM COATED ORAL at 14:06

## 2021-08-28 RX ADMIN — NICOTINE POLACRILEX 2 MG: 2 GUM, CHEWING BUCCAL at 12:02

## 2021-08-28 RX ADMIN — NICOTINE POLACRILEX 2 MG: 2 GUM, CHEWING BUCCAL at 00:14

## 2021-08-28 RX ADMIN — NICOTINE POLACRILEX 2 MG: 2 GUM, CHEWING ORAL at 20:10

## 2021-08-28 RX ADMIN — HYDROXYZINE HYDROCHLORIDE 25 MG: 25 TABLET, FILM COATED ORAL at 17:54

## 2021-08-28 RX ADMIN — NICOTINE POLACRILEX 2 MG: 2 GUM, CHEWING ORAL at 17:52

## 2021-08-28 RX ADMIN — HYDROXYZINE HYDROCHLORIDE 50 MG: 25 TABLET, FILM COATED ORAL at 09:51

## 2021-08-28 RX ADMIN — NICOTINE POLACRILEX 2 MG: 2 GUM, CHEWING BUCCAL at 10:49

## 2021-08-28 RX ADMIN — ONDANSETRON 4 MG: 4 TABLET, ORALLY DISINTEGRATING ORAL at 09:51

## 2021-08-28 RX ADMIN — HYDROXYZINE HYDROCHLORIDE 25 MG: 25 TABLET, FILM COATED ORAL at 20:10

## 2021-08-28 RX ADMIN — DIAZEPAM 10 MG: 5 TABLET ORAL at 05:35

## 2021-08-28 ASSESSMENT — MIFFLIN-ST. JEOR: SCORE: 1733.29

## 2021-08-28 ASSESSMENT — ACTIVITIES OF DAILY LIVING (ADL)
FALL_HISTORY_WITHIN_LAST_SIX_MONTHS: NO
TOILETING_ISSUES: NO
DRESSING/BATHING_DIFFICULTY: NO
DOING_ERRANDS_INDEPENDENTLY_DIFFICULTY: NO
WEAR_GLASSES_OR_BLIND: NO
DIFFICULTY_EATING/SWALLOWING: NO
DIFFICULTY_COMMUNICATING: NO
WALKING_OR_CLIMBING_STAIRS_DIFFICULTY: NO
CONCENTRATING,_REMEMBERING_OR_MAKING_DECISIONS_DIFFICULTY: YES

## 2021-08-28 NOTE — PLAN OF CARE
"  Problem: General Rehab Plan of Care  Goal: Therapeutic Recreation/Music Therapy Goal  Description: The patient and/or their representative will achieve their patient-specific goals related to the plan of care.  Outcome: No Change     Keaton attended art therapy group and reported feeling \"okay.\" He participated in the art activity to draw to music to practice mindfulness. He created two abstract pieces in response to songs played. Keaton interacted minimally with peers. He chose not to contribute to group discussion.   "

## 2021-08-28 NOTE — TELEPHONE ENCOUNTER
Patient cleared and ready for behavioral bed placement: Yes   S: 31/M, [facility], detox    B: Pt reports drinking 1 L of alcohol daily for years. Breathalyzer 0.241. Pt also reports using LSD frequently but did not use today. Pt is unsure if he has ever had a withdrawal seizure or DTs. Pt reported SI to mom earlier today however denies SI currently.     A: Voluntary  Ambulates independently. No acute medical concerns  CBC: WNL  CMP: Calcium 8.3 / Glucose 12  Covid test: negative  UDS: cannabis    R: 3A / [place] / CD  0237 - Paged on call. 0245 - On call accepts for 3AW. 0255 - Placed pt in queue and notified unit. RN will call ED for report. Notified ED.

## 2021-08-28 NOTE — H&P
Admitted: 08/27/2021    CHIEF COMPLAINT:  Detox from alcohol.    IDENTIFYING INFORMATION:  Rickey Brunner is a 31-year-old single male who is seeking detox from alcohol.    HISTORY OF PRESENT ILLNESS:  Rickey Brunner is a 31-year-old single male seeking detox from alcohol.  The patient has a history of anxiety, cannabis use disorder and hallucinogen use disorder.  The patient reported suicidal ideation to mother on 08/27/2021, but denies at the ED and denies it today.  The patient reports drinking a pint of vodka daily for the past 5 years.    The patient has tolerance, withdrawal, progressive use, loss of control, spending more time using alcohol, spending more time than intended using alcohol.  The patient has made attempts to quit, experiencing cravings and negative consequences.    PSYCHIATRIC REVIEW OF SYSTEMS:  Depression:  The patient reports that he is depressed, he has increased fatigue, low motivation, chronic negative thinking.  Patient denies suicidal ideation.  The patient reports anxiety is high, has panic attacks.  The patient denies any symptoms of abdiel.  Does not endorse any symptoms of psychosis including auditory or visual hallucinations or feelings of paranoia.  The patient denies any symptoms of PTSD, eating disorder or OCD.    PSYCHIATRIC HISTORY:  No prior inpatient hospitalizations.  Denies use of psychotropic medications.  Denies suicidal attempts.  Denies self-injurious behavior.    PAST MEDICAL HISTORY:  No acute issues.  Reviewed admission labs, unremarkable.  COVID screen is negative.    ALLERGIES:  NO KNOWN ALLERGIES.    SUBSTANCE ABUSE HISTORY:  U-tox is positive for cannabinoids.  BAL 0.241. Patient states that he has micro-doses LSD.  He has been doing this for years.  The patient states he uses it a couple times a week.  He also micro-doses mushrooms with the same frequency.  The patient denies any seizure history.  The patient reports going to 1 prior detox.  The patient denies  any prior chemical dependency treatment.    FAMILY HISTORY:  Father is .  Mother is alive.  The patient reports a good relationship with mother.  The patient denies any mental health issues in his family.  The patient reports addiction in an aunt and another aunt endorses Vicodin abuse.  The patient has 1 younger brother.    SOCIAL HISTORY:  The patient denies any trauma. Patient works as a sound and light director and a .  The patient states he lives by himself with his dog.  He has a girlfriend whom has been dating for 2 years.    MEDICAL REVIEW OF SYSTEMS:  Reviewed documentation for 10-point systems review completed by Rosa M Turner, nurse practitioner, dated 2021.  No changes noted.    PHYSICAL EXAMINATION:    VITAL SIGNS:  Blood pressure 122/81, pulse 93, respirations 16, temperature 98 Fahrenheit, SpO2 96%.  Weight 168 pounds.  Reviewed documentation for physical examination completed by Rosa M Turner, nurse practitioner, dated 2021.  No changes noted.    MENTAL STATUS EXAMINATION:  The patient appears his stated age.  He is dressed in scrubs.  He has adequate hygiene.  The patient was cooperative with wearing his mask.  He was cooperative in accompanying provider to the interview room,  calm and cooperative throughout the interview.  Eye contact adequate.  The patient did not display any psychomotor abnormalities.  Speech was spontaneous.  He used conversational rate, rhythm and tone.  Elaborated appropriately.  Mood is described as depressed.  Affect blunted, congruent.  Thought process linear and logical.  Associations intact.  Thought content:  Denies display evidence of psychosis.  He denies suicidal ideation.  No active intent.  Denies homicidal ideation.  Insight and judgment appear to be fair.  Cognition appears intact to interview including orientation to person, place, time, and situation, use of language and fund of knowledge.  Recent and remote memory are grossly  intact.  Muscle strength, tone, and gait appear within normal upon observation.    ASSESSMENT:     1.  Alcohol use disorder, severe.  2.  Alcohol withdrawal.  3.  History of general anxiety disorder.  4.  Rule out cannabis use disorder.  5.  Rule out hallucinogen use disorder.    PLAN:     1.  The patient has been admitted to Chemical Dependency Unit 3A on a voluntary basis.  2.  Discussed medications with the patient.  Provider discussed a trial of sertraline or Lexapro to address both depressive and anxiety symptoms.  The patient stated that he wanted to think about it.  We discussed risks, benefits, and side effects of medication with the patient.  The patient reports relief from anxiety with hydroxyzine.  3.  Psychosocial treatments to be addressed with CTC.  4.  Estimated length of stay is 2-3 days.      Debra A. Naegele, APRN, CNS        D: 2021   T: 2021   MT: JADA    Name:     BRUNNER, RICKEY L.  MRN:      [id number]        Account:     [id number]   :      1990           Admitted:    2021       Document: [id number]

## 2021-08-28 NOTE — PHARMACY-ADMISSION MEDICATION HISTORY
Admission Medication History Completed by Pharmacy    See [facility] Admission Navigator for allergy information, preferred outpatient pharmacy, prior to admission medications and immunization status.     Medication History Sources:     Lobo    Changes made to PTA medication list (reason):    Added: None    Deleted: ibuprofen    Changed: None    Additional Information:    Lobo reported he is not currently taking any medications.    Prior to Admission medications    None      Allergies were reviewed, assessed, and updated with the patient.       The information provided in this note is only as accurate as the sources available at the time of the update(s).    Date completed: 08/28/21    Medication history completed by: Luz Lopez RPH

## 2021-08-28 NOTE — PROGRESS NOTES
08/28/21 0414   Patient Belongings   Did you bring any home meds/supplements to the hospital?  No   Patient Belongings locker   Patient Belongings Put in Hospital Secure Location (Security or Locker, etc.) cash/credit card;clothing;watch;shoes   Belongings Search Yes   Clothing Search Yes   Second Staff Kevin S   Comment All of patient's belongings are in a bin and his discharge bin. A couple of Gnosticism medallion is in his room.     BIN:  Shorts, shirt, belt, cap,  shoes and a waist bag with shades and personal stuff.    SECURITY ENV # [id number] - MasterCard, Go To Card, [place] EBT Card and a watch.    A               Admission:  I am responsible for any personal items that are not sent to the safe or pharmacy.  [facility] is not responsible for loss, theft or damage of any property in my possession.    Signature:  _________________________________ Date: _______  Time: _____                                              Staff Signature:  ____________________________ Date: ________  Time: _____      2nd Staff person, if patient is unable/unwilling to sign:    Signature: ________________________________ Date: ________  Time: _____     Discharge:  [facility] has returned all of my personal belongings:    Signature: _________________________________ Date: ________  Time: _____                                          Staff Signature:  ____________________________ Date: ________  Time: _____

## 2021-08-28 NOTE — PLAN OF CARE
Pt's MSSA score was 2 and 3 . He did not receive any Valium this shift.  He reports chronic anxiety and rates is at a 2/10. He denies any feelings of depression. He has been out and present in the milieu. He does not have any physical  complaints related to his alcohol withdrawal at this time. He is requesting to talk to the MD tomorrow regarding being discharged.

## 2021-08-28 NOTE — CONSULTS
"[facility]  Consult Note - Hospitalist Service     Date of Admission:  8/27/2021  Consult Requested by: Dr. Guthrie  Reason for Consult: co-management of alcohol withdrawal    Assessment & Plan   Rickey L Brunner Jr. is a 31 year old male admitted on 8/27/2021. He has past medical history of alcohol dependence, anxiety, cannabis use, tobacco use, and LSD use. Per medical record, also patient also reported suicidal ideation to his mother on 8/27 but denied any suicidal ideation later in the emergency department. He is admitted for detox from alcohol.    #alcohol dependence  #alcohol withdrawal   Admit for same previously last February. Denies any history of withdrawal seizures.    -management by psychiatry    -current medications include: folic acid 1mg daily, multivitamin daily, thiamine daily, diazepam per MSSA scoring system prn, atenolol prn for HR >100, and hydroxyzine q4h prn for anxiety    #nicotine dependence    -nicotine gum 4mg q1h prn, not exceeding 96mg in 24 hrs    #anxiety  Patient states he has anxiety 'all the time'. Medicates with alcohol. States \"I think the anxiety is driving a lot of this\"    -psychiatry management    -prn hydroxyzine     The patient's care was discussed with the Bedside Nurse and Patient.    TAMAR [facility]  Securely message with the Vocera Web Console (learn more here)  Text page via [employer] Paging/Directory             ______________________________________________________________________    Chief Complaint   Alcohol dependence    History is obtained from the patient and electronic health record    History of Present Illness   Rickey L Brunner Jr. is a 31 year old male who has past medical history as listed above and is admitted for alcohol detox. There is one other record of admission in our system in 02/21 for same problem.    Right now, his withdrawal symptoms " include nausea, tachycardia, and anxiety. He is otherwise doing ok.    Review of Systems   CONSTITUTIONAL: NEGATIVE for fever, chills, change in weight  ENT/MOUTH: NEGATIVE for ear, mouth and throat problems  RESP: NEGATIVE for significant cough or SOB  CV: NEGATIVE for chest pain, palpitations or peripheral edema  GI: NEGATIVE for constipation and diarrhea. POSITIVE for nausea  : negative  PSYCHIATRIC: POSITIVE foranxiety    Past Medical History    I have reviewed this patient's medical history and updated it with pertinent information if needed.   Past Medical History:   Diagnosis Date     Alcohol dependence (H)        Past Surgical History   I have reviewed this patient's surgical history and updated it with pertinent information if needed.  Past Surgical History:   Procedure Laterality Date     ENT SURGERY      tubes     Social History   I have reviewed this patient's social history and updated it with pertinent information if needed.  Social History     Tobacco Use     Smoking status: Current Every Day Smoker     Smokeless tobacco: Never Used   Substance Use Topics     Alcohol use: Yes     Comment: over 12 oz vodka/day     Drug use: Yes     Types: Marijuana, LSD       Family History   I have reviewed this patient's family history and updated it with pertinent information if needed.  Family History   Problem Relation Age of Onset     Cancer Father      Medications   I have reviewed this patient's current medications    Allergies   No Known Allergies    Physical Exam   Vital Signs: Temp: 98  F (36.7  C) Temp src: Temporal BP: 122/81 Pulse: 93   Resp: 16 SpO2: 96 % O2 Device: None (Room air)    Weight: 168 lbs 0 oz    General Appearance: alert, no acute distress, sitting up in bed, pleasant  Eyes: pupils equal, round  HEENT: moist MM  Respiratory: LS CTAB  Cardiovascular: tachycardia on assessment. Regular rhythm  GI: abd soft and nontender  Lymph/Hematologic: deferred  Genitourinary: deferred  Skin: no  diaphoresis. Warm, dry, intact, without rashes to exposed skin  Musculoskeletal: moves all extremities, no difficulty with gait  Neurologic: no focal deficits on exam  Psychiatric: anxious.    Data   Results for orders placed or performed during the hospital encounter of 08/27/21 (from the past 24 hour(s))   Alcohol breath test POCT   Result Value Ref Range    Alcohol Breath Test 0.241 (A) 0.00 - 0.01   CBC with platelets differential    Narrative    The following orders were created for panel order CBC with platelets differential.  Procedure                               Abnormality         Status                     ---------                               -----------         ------                     CBC with platelets and d...[[id number]]                      Final result                 Please view results for these tests on the individual orders.   Comprehensive metabolic panel   Result Value Ref Range    Sodium 140 133 - 144 mmol/L    Potassium 3.7 3.4 - 5.3 mmol/L    Chloride 108 94 - 109 mmol/L    Carbon Dioxide (CO2) 26 20 - 32 mmol/L    Anion Gap 6 3 - 14 mmol/L    Urea Nitrogen 15 7 - 30 mg/dL    Creatinine 0.99 0.66 - 1.25 mg/dL    Calcium 8.3 (L) 8.5 - 10.1 mg/dL    Glucose 128 (H) 70 - 99 mg/dL    Alkaline Phosphatase 102 40 - 150 U/L    AST 40 0 - 45 U/L    ALT 48 0 - 70 U/L    Protein Total 8.0 6.8 - 8.8 g/dL    Albumin 4.0 3.4 - 5.0 g/dL    Bilirubin Total 0.3 0.2 - 1.3 mg/dL    GFR Estimate >90 >60 mL/min/1.73m2   Magnesium   Result Value Ref Range    Magnesium 2.2 1.6 - 2.3 mg/dL   CBC with platelets and differential   Result Value Ref Range    WBC Count 8.0 4.0 - 11.0 10e3/uL    RBC Count 4.86 4.40 - 5.90 10e6/uL    Hemoglobin 15.7 13.3 - 17.7 g/dL    Hematocrit 44.2 40.0 - 53.0 %    MCV 91 78 - 100 fL    MCH 32.3 26.5 - 33.0 pg    MCHC 35.5 31.5 - 36.5 g/dL    RDW 11.9 10.0 - 15.0 %    Platelet Count 356 150 - 450 10e3/uL    % Neutrophils 43 %    % Lymphocytes 44 %    % Monocytes 9 %    %  Eosinophils 3 %    % Basophils 1 %    % Immature Granulocytes 0 %    NRBCs per 100 WBC 0 <1 /100    Absolute Neutrophils 3.4 1.6 - 8.3 10e3/uL    Absolute Lymphocytes 3.5 0.8 - 5.3 10e3/uL    Absolute Monocytes 0.7 0.0 - 1.3 10e3/uL    Absolute Eosinophils 0.2 0.0 - 0.7 10e3/uL    Absolute Basophils 0.1 0.0 - 0.2 10e3/uL    Absolute Immature Granulocytes 0.0 <=0.0 10e3/uL    Absolute NRBCs 0.0 10e3/uL   Asymptomatic COVID-19 Virus (Coronavirus) by PCR Nasopharyngeal    Specimen: Nasopharyngeal; Swab   Result Value Ref Range    SARS CoV2 PCR Negative Negative    Narrative    Testing was performed using the alcides  SARS-CoV-2 & Influenza A/B Assay on the alcides  Pinky  System.  This test should be ordered for the detection of SARS-COV-2 in individuals who meet SARS-CoV-2 clinical and/or epidemiological criteria. Test performance is unknown in asymptomatic patients.  This test is for in vitro diagnostic use under the FDA EUA for laboratories certified under CLIA to perform moderate and/or high complexity testing. This test has not been FDA cleared or approved.  A negative test does not rule out the presence of PCR inhibitors in the specimen or target RNA in concentration below the limit of detection for the assay. The possibility of a false negative should be considered if the patient's recent exposure or clinical presentation suggests COVID-19.  [facility] are certified under the Clinical Laboratory Improvement Amendments of 1988 (CLIA-88) as qualified to perform moderate and/or high complexity laboratory testing.   Urine Drugs of Abuse Screen    Narrative    The following orders were created for panel order Urine Drugs of Abuse Screen.  Procedure                               Abnormality         Status                     ---------                               -----------         ------                     Drug abuse screen 1 urin...[[id number]]  Abnormal            Final result                  Please view results for these tests on the individual orders.   Drug abuse screen 1 urine (ED)   Result Value Ref Range    Amphetamines Urine Screen Negative Screen Negative    Barbiturates Urine Screen Negative Screen Negative    Benzodiazepines Urine Screen Negative Screen Negative    Cannabinoids Urine Screen Positive (A) Screen Negative    Cocaine Urine Screen Negative Screen Negative    Opiates Urine Screen Negative Screen Negative

## 2021-08-28 NOTE — ED NOTES
"Pt came in hallways and yelling, swearing and threatening to leave at this RN while working with another Pt. Pt yelling,\"  I've been waiting for my nicotine gum and I'm going out to smoke.\" Encouraged pt to go back to room and would get it as soon as able. Pt began swearing at staff, \"I want my f*cing gum I am sick of f*cking waiting and began to go out towards door. Pt was stopped by staff and security and pt returned to room. Another RN gave pt gum when able. Pt now calm in room.  "

## 2021-08-28 NOTE — PLAN OF CARE
"  Problem: Suicidal Behavior  Goal: Suicidal Behavior is Absent or Managed  Outcome: Improving     Problem: Alcohol Withdrawal  Goal: Alcohol Withdrawal Symptom Control  Outcome: No Change     Pt is a 32 yo male who was admitted voluntarily from the [facility] ED for alcohol withdrawal.    Pt reports drinking a pint of vodka daily \"for a long time, since age 24. It used to be whiskey\" said he last drank yesterday before his mother brought him to the hospital. Denies history of withdrawal seizure.     During the assessment interview, pt became teary. \" I let too many people down\"   Pt denies SI, but endorses \"I wouldn't do anything to kill myself, I'm very Sabianism, but I have been wishing that I was dead for a long time.\"     MSSA on admission was 8. He had visible shakes. Pt was medicated with 10mg valium.    "

## 2021-08-28 NOTE — PLAN OF CARE
Problem: Alcohol Withdrawal  Goal: Alcohol Withdrawal Symptom Control  Outcome: Improving      RN Assessment:  SI/Self harm:  Pt denies  Aggression/agitation/HI:  none reported or observed  AVH:  pt denies  Sleep: no reported concerns  PRN Med: hydroxyzine for anxiety x2 and zofran for nausea, both were effective  Medication AE: none reported or observed  Physical Complaints/Issues: nausea this morning, relieved with zofran.  I & O: eating and drinking well  ADLs: independent  Vitals:  WNL  COVID 19 Assessment:  negative  Milieu Participation: minimal, mostly keeps to self, spent most of the shift resting in his room  Behavior: pleasant and cooperative, engages easily when approached. Pt was a little tense and anxious in the morning, and appeared much more relaxed and calm in the afternoon.   MSSA: pt scored 4 and 4, no valium given.     No other concerns at this time. Nursing will continue to monitor and assess.

## 2021-08-29 VITALS
HEART RATE: 82 BPM | WEIGHT: 168 LBS | HEIGHT: 70 IN | BODY MASS INDEX: 24.05 KG/M2 | TEMPERATURE: 97.8 F | SYSTOLIC BLOOD PRESSURE: 130 MMHG | DIASTOLIC BLOOD PRESSURE: 89 MMHG | OXYGEN SATURATION: 96 % | RESPIRATION RATE: 16 BRPM

## 2021-08-29 PROCEDURE — 250N000013 HC RX MED GY IP 250 OP 250 PS 637: Performed by: CLINICAL NURSE SPECIALIST

## 2021-08-29 PROCEDURE — 99239 HOSP IP/OBS DSCHRG MGMT >30: CPT | Performed by: CLINICAL NURSE SPECIALIST

## 2021-08-29 PROCEDURE — 250N000013 HC RX MED GY IP 250 OP 250 PS 637: Performed by: PSYCHIATRY & NEUROLOGY

## 2021-08-29 RX ORDER — HYDROXYZINE HYDROCHLORIDE 25 MG/1
25-50 TABLET, FILM COATED ORAL EVERY 4 HOURS PRN
Qty: 120 TABLET | Refills: 0 | Status: SHIPPED | OUTPATIENT
Start: 2021-08-29

## 2021-08-29 RX ADMIN — NICOTINE POLACRILEX 2 MG: 2 GUM, CHEWING ORAL at 11:30

## 2021-08-29 RX ADMIN — FOLIC ACID 1 MG: 1 TABLET ORAL at 11:05

## 2021-08-29 RX ADMIN — THIAMINE HCL TAB 100 MG 100 MG: 100 TAB at 11:05

## 2021-08-29 RX ADMIN — MULTIPLE VITAMINS W/ MINERALS TAB 1 TABLET: TAB at 11:05

## 2021-08-29 RX ADMIN — NICOTINE POLACRILEX 4 MG: 2 GUM, CHEWING ORAL at 12:32

## 2021-08-29 ASSESSMENT — ACTIVITIES OF DAILY LIVING (ADL)
HYGIENE/GROOMING: INDEPENDENT
DRESS: INDEPENDENT;SCRUBS (BEHAVIORAL HEALTH)
LAUNDRY: WITH SUPERVISION
ORAL_HYGIENE: INDEPENDENT

## 2021-08-29 NOTE — PLAN OF CARE
Patient appeared to be asleep for 6.5 hours during safety checks this shift. No complaints or concerns voiced by patient or noted by staff. Will continue to monitor and update if there are changes.

## 2021-08-29 NOTE — PLAN OF CARE
Out Of Detox/Discharge Note:  Lobo has not received Valium per [facility] protocol since 0535 am 8/28, which is greater than 24 hours. Pt is out of detox status. Pt is voluntary, requesting to discharge today. Writer spoke with Provider conveying his desire to leave today.    Writer met with Pt to review discharge instructions and answer questions. Lobo received his discharge medication (hydroxyzine) and his belongings.  Pt was escorted to [facility] by unit staff, was picked up by his mother and discharged home at 2:25 pm.

## 2021-08-29 NOTE — DISCHARGE INSTRUCTIONS
Behavioral Discharge Planning and Instructions  THANK YOU FOR CHOOSING THE [facility]  3A  [phone]    Summary: You were admitted to Station 3[facility] on 8/28/21  for detoxification from alcohol.  A medical exam was performed that included lab work. You have met with a  and opted to discharge.  Please take care and make your recovery a priority, Lobo!    Recommendation:    Main Diagnosis: Per Dr. Jairon Berger MD;  303.90 (F10.20) Alcohol Use Disorder Moderate    Major Treatments, Procedures and Findings:  You have withdrawn from alcohol  using Valium per [facility] protocol.  You have met with a  to develop a treatment plan for discharge.  You have had labs drawn and those results have been reviewed with you. Please take a copy of your lab work with you to your next primary care physician appointment.  Major Treatments, Procedures and Findings:  You were detoxed from alcohol with the Modified Selective Severity Protocol using Valium. You have met with a  to develop a treatment plan for discharge.  You have had labs drawn and a copy of those labs will be sent home with you.  Please bring your lab results with to your follow up doctor appointment.    Symptoms to Report:  If you experience more anxiety, confusion, sleeplessness, deep sadness or thoughts of suicide, notify your treatment team or notify your primary care physician. IF ANY OF THE SYMPTOMS YOU ARE EXPERIENCING ARE A MEDICAL EMERGENCY CALL 911 IMMEDIATELY.     Lifestyle Adjustment: Adjust your lifestyle to get enough sleep, relaxation, exercise and  good nutrition. Continue to develop healthy coping skills to decrease stress and promote a sober living environment. Do not use alcohol, illegal drugs or addictive medications other than what is currently prescribed. AA, NA, and  Sponsor are excellent resources for support.     Primary Provider: Deborah Naegele NP    Disposition: Home      Facts  "about COVID19 at www.cdc.gov/COVID19 and www.[place].gov/covid19    Keeping hands clean is one of the most important steps we can take to avoid getting sick and spreading germs to others.  Please wash your hands frequently and lather with soap for at least 20 seconds!    Follow-up Appointment:   Appointment Date/Time:     Psychiatrist/Primary Care Giver:         Address:         Phone Number:    Resources:     Resources for on line recovery meetings:      *due to covid-19 AA/NA meetings are being held online*      AA meetings can be found online; search for them at: http://aaStatusPageintergroup.org/directory.php  AA meetings via [place] for [place] area can be found online at: https://aaYelloYelloneaFocus Media.org/find-a-meeting/holiday-closings/  NA meetings via ZOOM for [place] area can be found online at: https://sites.[employer].com/view/[place]regionofnarcoticsanonymous/home?authuser=2    Www.[place]  has online resources for meeting and recovery care including Podcast \"Let's Talk:Addiction & Recovery Podcasts    Www.mnrecMetranome.org     DISCHARGE RESOURCES:  -SMART Recovery - self management for addiction recovery:  www.smartrecovery.org    -Pathways ~ A Health Crisis Resource & Support Center: 199.111.1932.  -[facility] [phone]   -[facility] Behavioral [facility] 821-424-6189 or [phone].  -Suicide Awareness Voices of Education (SAVE) (www.save.org): 384-529-LEPT (5906)  -National Suicide Prevention Line (www.mentalhealthmn.org): 337-393-VOOZ (9781)  -National [place] on Mental Illness (www.[place].anthony.org): 553.971.5447 or 208-871-9150.  -Pbvs3gdhu: text the word LIFE to 73744 for immediate support and crisis intervention  -Mental Health Consumer/Survivor Network of [place] (www.mhcsn.net): 721.922.5284 or 651-932-0061  -Mental Health Association of [place] (www.mentalhealth.org): 925.115.5761 or 325-856-9982     -Substance Abuse and Mental Health Services (www.samhsa.gov)  -Harm Reduction Coalition (www. " Harmreduction.org)  -www.prescribetoprevent.org or http://prescribetoprevent.org/video  -Poison control 1-740.793.3122   **[place] Opioid Prevention Coalition: www.opioidcoalition.org    Sober Support Group Information:  AA/NA & Sponsor/Support  -Alcoholics Anonymous (www.alcoholics-anonymous.org): for local information 24 hours/day  -AA Intergroup service office in [place] (http://www.aastpaul.org/) 777.799.8874  -AA Intergroup service office in [facility]: 922.972.3696. (http://www.aamin[place].org/)  -Narcotics Anonymous (www.naminn[place].org) (918) 299-5733   **Sober Fun Activities: www.sober-activities.Sape/[facility]//[facility] Recovery Connection ([facility])  [facility] connects people seeking recovery to resources that help foster and sustain long-term recovery.  Whether you are seeking resources for treatment, transportation, housing, job training, education, health care or other pathways to recovery, [facility] is a great place to start.    Phone: 233.191.7971.  www.[place]Retention Education.NeuroV[employer]il (Great listing of all types of recovery and non-recovery related resources)      General Medication Instructions:   See your medication sheet(s) for instructions.   Take all medicines as directed.  Make no changes unless your doctor suggests them.   Go to all your doctor visits.  Be sure to have all your required lab tests. This way, your medicines can be refilled on time.  Do not use any drugs not prescribed by your provider.  AA/NA and Sponsors are excellent resources for support  Avoid alcohol.    Any follow up concerns:  Nursing questions call the [facility] [phone]  Medical Record call [phone]  Outpatient Behavioral Intake call [phone]  LP+ Wait List/Bed Availability call [phone]    The entire treatment team has appreciated the opportunity to work with you Lobo.  We wish you the best in the future and with your lifelong recovery goals. Please bring this discharge folder with  you to all follow up appointments.  It contains your lab results, diagnosis, medication list and discharge recommendations.    [facility]

## 2021-08-29 NOTE — DISCHARGE SUMMARY
Psychiatric Discharge Summary    Rickey L Brunner Jr. MRN# [id number]   Age: 31 year old YOB: 1990     Date of Admission:  8/27/2021  Date of Discharge:  8/29/2021  Admitting Physician:  Julian Guthrie MD  Discharge Physician:  Debra A. Naegele, APRN CNS (Contact: [phone])         Event Leading to Hospitalization:    Rickey Brunner is a 31-year-old single male seeking detox from alcohol.  The patient has a history of anxiety, cannabis use disorder and hallucinogen use disorder.  The patient reported suicidal ideation to mother on 08/27/2021, but denies at the ED and denies it today.  The patient reports drinking a pint of vodka daily for the past 5 years.     The patient has tolerance, withdrawal, progressive use, loss of control, spending more time using alcohol, spending more time than intended using alcohol.  The patient has made attempts to quit, experiencing cravings and negative consequences.          See Admission note by Naegele, Debra Ann, APRN CNS on 8/28/2021  for additional details.          DIagnoses:     1.  Alcohol use disorder, severe.  2.  Alcohol withdrawal.  3.  History of general anxiety disorder.  4.  Rule out cannabis use disorder.  5.  Rule out hallucinogen use disorder.            Labs:     Results for orders placed or performed during the hospital encounter of 08/27/21   CBC with platelets differential     Status: None    Narrative    The following orders were created for panel order CBC with platelets differential.  Procedure                               Abnormality         Status                     ---------                               -----------         ------                     CBC with platelets and d...[[id number]]                      Final result                 Please view results for these tests on the individual orders.   Comprehensive metabolic panel     Status: Abnormal   Result Value Ref Range    Sodium 140 133 - 144 mmol/L    Potassium 3.7 3.4 - 5.3  mmol/L    Chloride 108 94 - 109 mmol/L    Carbon Dioxide (CO2) 26 20 - 32 mmol/L    Anion Gap 6 3 - 14 mmol/L    Urea Nitrogen 15 7 - 30 mg/dL    Creatinine 0.99 0.66 - 1.25 mg/dL    Calcium 8.3 (L) 8.5 - 10.1 mg/dL    Glucose 128 (H) 70 - 99 mg/dL    Alkaline Phosphatase 102 40 - 150 U/L    AST 40 0 - 45 U/L    ALT 48 0 - 70 U/L    Protein Total 8.0 6.8 - 8.8 g/dL    Albumin 4.0 3.4 - 5.0 g/dL    Bilirubin Total 0.3 0.2 - 1.3 mg/dL    GFR Estimate >90 >60 mL/min/1.73m2   Magnesium     Status: Normal   Result Value Ref Range    Magnesium 2.2 1.6 - 2.3 mg/dL   CBC with platelets and differential     Status: None   Result Value Ref Range    WBC Count 8.0 4.0 - 11.0 10e3/uL    RBC Count 4.86 4.40 - 5.90 10e6/uL    Hemoglobin 15.7 13.3 - 17.7 g/dL    Hematocrit 44.2 40.0 - 53.0 %    MCV 91 78 - 100 fL    MCH 32.3 26.5 - 33.0 pg    MCHC 35.5 31.5 - 36.5 g/dL    RDW 11.9 10.0 - 15.0 %    Platelet Count 356 150 - 450 10e3/uL    % Neutrophils 43 %    % Lymphocytes 44 %    % Monocytes 9 %    % Eosinophils 3 %    % Basophils 1 %    % Immature Granulocytes 0 %    NRBCs per 100 WBC 0 <1 /100    Absolute Neutrophils 3.4 1.6 - 8.3 10e3/uL    Absolute Lymphocytes 3.5 0.8 - 5.3 10e3/uL    Absolute Monocytes 0.7 0.0 - 1.3 10e3/uL    Absolute Eosinophils 0.2 0.0 - 0.7 10e3/uL    Absolute Basophils 0.1 0.0 - 0.2 10e3/uL    Absolute Immature Granulocytes 0.0 <=0.0 10e3/uL    Absolute NRBCs 0.0 10e3/uL   Asymptomatic COVID-19 Virus (Coronavirus) by PCR Nasopharyngeal     Status: Normal    Specimen: Nasopharyngeal; Swab   Result Value Ref Range    SARS CoV2 PCR Negative Negative    Narrative    Testing was performed using the alcides  SARS-CoV-2 & Influenza A/B Assay on the alcides  Pinky  System.  This test should be ordered for the detection of SARS-COV-2 in individuals who meet SARS-CoV-2 clinical and/or epidemiological criteria. Test performance is unknown in asymptomatic patients.  This test is for in vitro diagnostic use under the FDA  EUA for laboratories certified under CLIA to perform moderate and/or high complexity testing. This test has not been FDA cleared or approved.  A negative test does not rule out the presence of PCR inhibitors in the specimen or target RNA in concentration below the limit of detection for the assay. The possibility of a false negative should be considered if the patient's recent exposure or clinical presentation suggests COVID-19.  [facility] are certified under the Clinical Laboratory Improvement Amendments of 1988 (CLIA-88) as qualified to perform moderate and/or high complexity laboratory testing.   Drug abuse screen 1 urine (ED)     Status: Abnormal   Result Value Ref Range    Amphetamines Urine Screen Negative Screen Negative    Barbiturates Urine Screen Negative Screen Negative    Benzodiazepines Urine Screen Negative Screen Negative    Cannabinoids Urine Screen Positive (A) Screen Negative    Cocaine Urine Screen Negative Screen Negative    Opiates Urine Screen Negative Screen Negative   Internal Medicine Adult IP Consult for [facility]H Detox on 3A: Patient to be seen: Routine within 24 hrs; Call back #: [phone]; Alcohol Withdrawal; Consultant may enter orders: Yes; Requesting provider? Attending physician     Status: None ()    Rosa M Salazar APRN CNP     8/28/2021 12:10 PM  [facility]  Consult Note - Hospitalist Service     Date of Admission:  8/27/2021  Consult Requested by: Dr. Guthrie  Reason for Consult: co-management of alcohol withdrawal    Assessment & Plan   Rickey L Brunner Jr. is a 31 year old male admitted on 8/27/2021.   He has past medical history of alcohol dependence, anxiety,   cannabis use, tobacco use, and LSD use. Per medical record, also   patient also reported suicidal ideation to his mother on 8/27 but   denied any suicidal ideation later in the emergency department.   He is admitted for detox from  "alcohol.    #alcohol dependence  #alcohol withdrawal   Admit for same previously last February. Denies any history of   withdrawal seizures.    -management by psychiatry    -current medications include: folic acid 1mg daily,   multivitamin daily, thiamine daily, diazepam per MSSA scoring   system prn, atenolol prn for HR >100, and hydroxyzine q4h prn for   anxiety    #nicotine dependence    -nicotine gum 4mg q1h prn, not exceeding 96mg in 24 hrs    #anxiety  Patient states he has anxiety 'all the time'. Medicates with   alcohol. States \"I think the anxiety is driving a lot of this\"    -psychiatry management    -prn hydroxyzine     The patient's care was discussed with the Bedside Nurse and   Patient.    TAMAR [facility]  Securely message with the Vocera Web Console (learn more here)  Text page via [facility] Paging/Directory             __________________________________________________________________  ____    Chief Complaint   Alcohol dependence    History is obtained from the patient and electronic health record    History of Present Illness   Rickey L Brunner Jr. is a 31 year old male who has past medical   history as listed above and is admitted for alcohol detox. There   is one other record of admission in our system in 02/21 for same   problem.    Right now, his withdrawal symptoms include nausea, tachycardia,   and anxiety. He is otherwise doing ok.    Review of Systems   CONSTITUTIONAL: NEGATIVE for fever, chills, change in weight  ENT/MOUTH: NEGATIVE for ear, mouth and throat problems  RESP: NEGATIVE for significant cough or SOB  CV: NEGATIVE for chest pain, palpitations or peripheral edema  GI: NEGATIVE for constipation and diarrhea. POSITIVE for nausea  : negative  PSYCHIATRIC: POSITIVE foranxiety    Past Medical History    I have reviewed this patient's medical history and updated it   with pertinent information if needed.   Past " Medical History:   Diagnosis Date     Alcohol dependence (H)        Past Surgical History   I have reviewed this patient's surgical history and updated it   with pertinent information if needed.  Past Surgical History:   Procedure Laterality Date     ENT SURGERY      tubes     Social History   I have reviewed this patient's social history and updated it with   pertinent information if needed.  Social History     Tobacco Use     Smoking status: Current Every Day Smoker     Smokeless tobacco: Never Used   Substance Use Topics     Alcohol use: Yes     Comment: over 12 oz vodka/day     Drug use: Yes     Types: Marijuana, LSD       Family History   I have reviewed this patient's family history and updated it with   pertinent information if needed.  Family History   Problem Relation Age of Onset     Cancer Father      Medications   I have reviewed this patient's current medications    Allergies   No Known Allergies    Physical Exam   Vital Signs: Temp: 98  F (36.7  C) Temp src: Temporal BP: 122/81   Pulse: 93   Resp: 16 SpO2: 96 % O2 Device: None (Room air)    Weight: 168 lbs 0 oz    General Appearance: alert, no acute distress, sitting up in bed,   pleasant  Eyes: pupils equal, round  HEENT: moist MM  Respiratory: LS CTAB  Cardiovascular: tachycardia on assessment. Regular rhythm  GI: abd soft and nontender  Lymph/Hematologic: deferred  Genitourinary: deferred  Skin: no diaphoresis. Warm, dry, intact, without rashes to   exposed skin  Musculoskeletal: moves all extremities, no difficulty with gait  Neurologic: no focal deficits on exam  Psychiatric: anxious.    Data   Results for orders placed or performed during the hospital   encounter of 08/27/21 (from the past 24 hour(s))   Alcohol breath test POCT   Result Value Ref Range    Alcohol Breath Test 0.241 (A) 0.00 - 0.01   CBC with platelets differential    Narrative    The following orders were created for panel order CBC with   platelets differential.  Procedure                                Abnormality           Status                     ---------                               -----------           ------                     CBC with platelets and d...[[id number]]                      Final   result                 Please view results for these tests on the individual orders.   Comprehensive metabolic panel   Result Value Ref Range    Sodium 140 133 - 144 mmol/L    Potassium 3.7 3.4 - 5.3 mmol/L    Chloride 108 94 - 109 mmol/L    Carbon Dioxide (CO2) 26 20 - 32 mmol/L    Anion Gap 6 3 - 14 mmol/L    Urea Nitrogen 15 7 - 30 mg/dL    Creatinine 0.99 0.66 - 1.25 mg/dL    Calcium 8.3 (L) 8.5 - 10.1 mg/dL    Glucose 128 (H) 70 - 99 mg/dL    Alkaline Phosphatase 102 40 - 150 U/L    AST 40 0 - 45 U/L    ALT 48 0 - 70 U/L    Protein Total 8.0 6.8 - 8.8 g/dL    Albumin 4.0 3.4 - 5.0 g/dL    Bilirubin Total 0.3 0.2 - 1.3 mg/dL    GFR Estimate >90 >60 mL/min/1.73m2   Magnesium   Result Value Ref Range    Magnesium 2.2 1.6 - 2.3 mg/dL   CBC with platelets and differential   Result Value Ref Range    WBC Count 8.0 4.0 - 11.0 10e3/uL    RBC Count 4.86 4.40 - 5.90 10e6/uL    Hemoglobin 15.7 13.3 - 17.7 g/dL    Hematocrit 44.2 40.0 - 53.0 %    MCV 91 78 - 100 fL    MCH 32.3 26.5 - 33.0 pg    MCHC 35.5 31.5 - 36.5 g/dL    RDW 11.9 10.0 - 15.0 %    Platelet Count 356 150 - 450 10e3/uL    % Neutrophils 43 %    % Lymphocytes 44 %    % Monocytes 9 %    % Eosinophils 3 %    % Basophils 1 %    % Immature Granulocytes 0 %    NRBCs per 100 WBC 0 <1 /100    Absolute Neutrophils 3.4 1.6 - 8.3 10e3/uL    Absolute Lymphocytes 3.5 0.8 - 5.3 10e3/uL    Absolute Monocytes 0.7 0.0 - 1.3 10e3/uL    Absolute Eosinophils 0.2 0.0 - 0.7 10e3/uL    Absolute Basophils 0.1 0.0 - 0.2 10e3/uL    Absolute Immature Granulocytes 0.0 <=0.0 10e3/uL    Absolute NRBCs 0.0 10e3/uL   Asymptomatic COVID-19 Virus (Coronavirus) by PCR Nasopharyngeal    Specimen: Nasopharyngeal; Swab   Result Value Ref Range    SARS CoV2 PCR Negative  Negative    Narrative    Testing was performed using the alcides  SARS-CoV-2 & Influenza   A/B Assay on the alcides  Pinky  System.  This test should be   ordered for the detection of SARS-COV-2 in individuals who meet   SARS-CoV-2 clinical and/or epidemiological criteria. Test   performance is unknown in asymptomatic patients.  This test is   for in vitro diagnostic use under the FDA EUA for laboratories   certified under CLIA to perform moderate and/or high complexity   testing. This test has not been FDA cleared or approved.  A   negative test does not rule out the presence of PCR inhibitors in   the specimen or target RNA in concentration below the limit of   detection for the assay. The possibility of a false negative   should be considered if the patient's recent exposure or clinical   presentation suggests COVID-19.  [facility]   are certified under the Clinical Laboratory Improvement   Amendments of 1988 (CLIA-88) as qualified to perform moderate   and/or high complexity laboratory testing.   Urine Drugs of Abuse Screen    Narrative    The following orders were created for panel order Urine Drugs of   Abuse Screen.  Procedure                               Abnormality           Status                     ---------                               -----------           ------                     Drug abuse screen 1 urin...[[id number]]  Abnormal            Final   result                 Please view results for these tests on the individual orders.   Drug abuse screen 1 urine (ED)   Result Value Ref Range    Amphetamines Urine Screen Negative Screen Negative    Barbiturates Urine Screen Negative Screen Negative    Benzodiazepines Urine Screen Negative Screen Negative    Cannabinoids Urine Screen Positive (A) Screen Negative    Cocaine Urine Screen Negative Screen Negative    Opiates Urine Screen Negative Screen Negative      Alcohol breath test POCT     Status: Abnormal   Result Value Ref Range     Alcohol Breath Test 0.241 (A) 0.00 - 0.01   Urine Drugs of Abuse Screen     Status: Abnormal    Narrative    The following orders were created for panel order Urine Drugs of Abuse Screen.  Procedure                               Abnormality         Status                     ---------                               -----------         ------                     Drug abuse screen 1 urin...[[id number]]  Abnormal            Final result                 Please view results for these tests on the individual orders.            Consults:   Consultation during this admission received from internal medicine for H&P    Rosa M Turner APRN CNP   Nurse Practitioner   Medicine   Consults       Signed   Date of Service:  8/28/2021  8:43 AM   Creation Time:  8/28/2021  8:42 AM         Consult Orders             Expand AllCollapse All      []Hide copied text    []Servando for details  [facility]  Consult Note - Hospitalist Service     Date of Admission:  8/27/2021  Consult Requested by: Dr. Guthrie  Reason for Consult: co-management of alcohol withdrawal        Assessment & Plan     Rickey L Brunner Jr. is a 31 year old male admitted on 8/27/2021. He has past medical history of alcohol dependence, anxiety, cannabis use, tobacco use, and LSD use. Per medical record, also patient also reported suicidal ideation to his mother on 8/27 but denied any suicidal ideation later in the emergency department. He is admitted for detox from alcohol.     #alcohol dependence  #alcohol withdrawal   Admit for same previously last February. Denies any history of withdrawal seizures.    -management by psychiatry    -current medications include: folic acid 1mg daily, multivitamin daily, thiamine daily, diazepam per MSSA scoring system prn, atenolol prn for HR >100, and hydroxyzine q4h prn for anxiety     #nicotine dependence    -nicotine gum 4mg q1h prn, not exceeding 96mg in 24  "hrs     #anxiety  Patient states he has anxiety 'all the time'. Medicates with alcohol. States \"I think the anxiety is driving a lot of this\"    -psychiatry management    -prn hydroxyzine        The patient's care was discussed with the Bedside Nurse and Patient.     Rosa M Turner, TAMAR SAUCEDA  [facility]  Securely message with the Vocera Web Console (learn more here)  Text page via [employer] Paging/Directory        ______________________________________________________________________        Chief Complaint      Alcohol dependence     History is obtained from the patient and electronic health record           History of Present Illness     Rickey L Brunner Jr. is a 31 year old male who has past medical history as listed above and is admitted for alcohol detox. There is one other record of admission in our system in 02/21 for same problem.     Right now, his withdrawal symptoms include nausea, tachycardia, and anxiety. He is otherwise doing ok.           Review of Systems      CONSTITUTIONAL: NEGATIVE for fever, chills, change in weight  ENT/MOUTH: NEGATIVE for ear, mouth and throat problems  RESP: NEGATIVE for significant cough or SOB  CV: NEGATIVE for chest pain, palpitations or peripheral edema  GI: NEGATIVE for constipation and diarrhea. POSITIVE for nausea  : negative  PSYCHIATRIC: POSITIVE foranxiety           Past Medical History       I have reviewed this patient's medical history and updated it with pertinent information if needed.        Past Medical History:   Diagnosis Date     Alcohol dependence (H)                 Past Surgical History      I have reviewed this patient's surgical history and updated it with pertinent information if needed.        Past Surgical History:   Procedure Laterality Date     ENT SURGERY         tubes            Social History      I have reviewed this patient's social history and updated it with pertinent information if " needed.  Social History            Tobacco Use     Smoking status: Current Every Day Smoker     Smokeless tobacco: Never Used   Substance Use Topics     Alcohol use: Yes       Comment: over 12 oz vodka/day     Drug use: Yes       Types: Marijuana, LSD               Family History      I have reviewed this patient's family history and updated it with pertinent information if needed.        Family History   Problem Relation Age of Onset     Cancer Father              Medications      I have reviewed this patient's current medications           Allergies      No Known Allergies           Physical Exam     Vital Signs: Temp: 98  F (36.7  C) Temp src: Temporal BP: 122/81 Pulse: 93   Resp: 16 SpO2: 96 % O2 Device: None (Room air)    Weight: 168 lbs 0 oz     General Appearance: alert, no acute distress, sitting up in bed, pleasant  Eyes: pupils equal, round  HEENT: moist MM  Respiratory: LS CTAB  Cardiovascular: tachycardia on assessment. Regular rhythm  GI: abd soft and nontender  Lymph/Hematologic: deferred  Genitourinary: deferred  Skin: no diaphoresis. Warm, dry, intact, without rashes to exposed skin  Musculoskeletal: moves all extremities, no difficulty with gait  Neurologic: no focal deficits on exam  Psychiatric: anxious.                          Hospital Course:   Rickey L Brunner Jr. was admitted to [facility] with attending Jairon Berger MD as a voluntary patient. The patient was placed under status 15 (15 minute checks) to ensure patient safety.       MSSA protocol was initiated due to the patient's history of alcohol abuse and concern for withdrawal symptoms. Patient was safely detoxed form alcohol.     Provider discussed sertraline with patient. He de,lined saying he wanted to research medications. He was concerned he may become addicted to the medications. Patient requested hydroxyzine for anxiety . He felt it was effective and would help him avoid alcohol. Discussed risks, benefits and side effects of  medications with patient.       Rickey L Brunner Jr. did participate in groups and was visible in the milieu. Patient reports improved mood and deneis suicidal ideation.     The patient's symptoms of alcohol wihtdrawl improved.     Patient reports he will go to AA meetings.     Rickey L Brunner Jr. was released to home. At the time of discharge Rickey L Brunner Jr. was determined to not be a danger to himself or others.          Discharge Medications:     Current Discharge Medication List      START taking these medications    Details   hydrOXYzine (ATARAX) 25 MG tablet Take 1-2 tablets (25-50 mg) by mouth every 4 hours as needed for anxiety or other (adjuvant pain)  Qty: 120 tablet, Refills: 0    Associated Diagnoses: Anxiety                  Psychiatric Examination:   Appearance:  awake, alert and adequately groomed  Attitude:  cooperative  Eye Contact:  good  Mood:  good  Affect:  appropriate and in normal range  Speech:  clear, coherent  Psychomotor Behavior:  no evidence of tardive dyskinesia, dystonia, or tics  Thought Process:  logical, linear and goal oriented  Associations:  no loose associations  Thought Content:  no evidence of suicidal ideation or homicidal ideation  Insight:  fair  Judgment:  fair  Oriented to:  time, person, and place  Attention Span and Concentration:  intact  Recent and Remote Memory:  intact  Language: Able to name objects, Able to repeat phrases and Able to read and write  Fund of Knowledge: appropriate  Muscle Strength and Tone: normal  Gait and Station: Normal         Discharge Plan:   Behavioral Discharge Planning and Instructions  THANK YOU FOR CHOOSING THE [address]  [phone]     Summary: You were admitted to [facility] on 8/28/21  for detoxification from alcohol.  A medical exam was performed that included lab work. You have met with a  and opted to discharge.  Please take care and make your recovery a priority,  Lobo!     Recommendation:    Main Diagnosis: Per Dr. Jairon Berger MD;  303.90 (F10.20) Alcohol Use Disorder Moderate     Major Treatments, Procedures and Findings:  You have withdrawn from alcohol  using Valium per [facility] protocol.  You have met with a  to develop a treatment plan for discharge.  You have had labs drawn and those results have been reviewed with you. Please take a copy of your lab work with you to your next primary care physician appointment.  Major Treatments, Procedures and Findings:  You were detoxed from alcohol with the Modified Selective Severity Protocol using Valium. You have met with a  to develop a treatment plan for discharge.  You have had labs drawn and a copy of those labs will be sent home with you.  Please bring your lab results with to your follow up doctor appointment.     Symptoms to Report:  If you experience more anxiety, confusion, sleeplessness, deep sadness or thoughts of suicide, notify your treatment team or notify your primary care physician. IF ANY OF THE SYMPTOMS YOU ARE EXPERIENCING ARE A MEDICAL EMERGENCY CALL 911 IMMEDIATELY.      Lifestyle Adjustment: Adjust your lifestyle to get enough sleep, relaxation, exercise and  good nutrition. Continue to develop healthy coping skills to decrease stress and promote a sober living environment. Do not use alcohol, illegal drugs or addictive medications other than what is currently prescribed. AA, NA, and  Sponsor are excellent resources for support.      Primary Provider: Deborah Naegele NP     Disposition: Home        Facts about COVID19 at www.cdc.gov/COVID19 and www.[place].gov/covid19     Keeping hands clean is one of the most important steps we can take to avoid getting sick and spreading germs to others.  Please wash your hands frequently and lather with soap for at least 20 seconds!     Follow-up Appointment:   Appointment Date/Time:     Psychiatrist/Primary Care Giver:         Address:          "Phone Number:     Resources:      Resources for on line recovery meetings:        *due to covid-19 AA/NA meetings are being held online*        AA meetings can be found online; search for them at: http://aa-intergroup.org/directory.php  AA meetings via [employer] for [place] area can be found online at: https://a[place].org/find-[email or web address]-meeting/holiday-closings/  NA meetings via [employer] for [place] area can be found online at: [email or web address]://[email or web address]?[email or web address]     Www.WhoKnows  has online resources for meeting and recovery care including Podcast \"Let's Talk:Addiction & Recovery Podcasts     Www.[place]recovery.org      DISCHARGE RESOURCES:  -SMART Recovery - self management for addiction recovery:  www.smartrec[employer].org    -[facility] ~ A Health Crisis Resource & Support Center: 282.983.7722.  -[place] [facility] [phone]   -[facility] Behavioral Intake [phone] or [phone].  -Suicide Awareness Voices of Education (SAVE) (www.save.org): 321-534-BQWM (9648)  -National Suicide Prevention Line (www.mentalhealthmn.org): 272-260-TQDG (7727)  -National [place] on Mental Illness (www.mn.anthony.org): 516.819.6862 or 476-036-2076.  -Xxwv0lugv: text the word LIFE to 54[address] for immediate support and crisis intervention  -Mental Health Consumer/Survivor Network of [place] (www.mhcsn.net): 352.898.1992 or 405-166-3374  -Mental Health Association of [place] (www.mentalhealth.org): 437.132.3884 or 519-665-5375     -Substance Abuse and Mental Health Services (www.samhsa.gov)  -Harm Reduction Coalition (www. Harmreduction.org)  -www.prescribetoprevent.org or http://prescribetoprevent.org/video  -Poison control 3-662-393-4038   **[place] Opioid Prevention Coalition: www.opioidcoalition.org     Sober Support Group Information:  AA/NA & Sponsor/Support  -Alcoholics Anonymous (www.alcoholics-anonymous.org): for local information 24 hours/day  -AA Intergroup service office in [place] " (http://www.aastpaul.org/) 673.399.2251  -AA Intergroup service office in [facility]: [phone]. (http://www.a[place].org/)  -Narcotics Anonymous (www.nam[place].org) (209) 852-2081   **Sober Fun Activities: www.soberKVK TEAMactivities.[place]/[facility]//[facility] Recovery Connection ([facility])  [facility] connects people seeking recovery to resources that help foster and sustain long-term recovery.  Whether you are seeking resources for treatment, transportation, housing, job training, education, health care or other pathways to recovery, [facility] is a great place to start.    Phone: 290.485.4072.  www.[place]et (Great listing of all types of recovery and non-recovery related resources)        General Medication Instructions:   See your medication sheet(s) for instructions.   Take all medicines as directed.  Make no changes unless your doctor suggests them.   Go to all your doctor visits.  Be sure to have all your required lab tests. This way, your medicines can be refilled on time.  Do not use any drugs not prescribed by your provider.  AA/NA and Sponsors are excellent resources for support  Avoid alcohol.     Any follow up concerns:  Nursing questions call the [address] [phone]  Medical Record call [phone]  Outpatient Behavioral Intake call [phone]  LP+ Wait List/Bed Availability call [phone]     The entire treatment team has appreciated the opportunity to work with you Lobo.  We wish you the best in the future and with your lifelong recovery goals. Please bring this discharge folder with you to all follow up appointments.  It contains your lab results, diagnosis, medication list and discharge recommendations.     THANK YOU FOR CHOOSING THE [facility]         Attestation:  The patient has been seen and evaluated by me,  Debra A. Naegele, APRN CNS on 8/29/2021  Discharge summary time > 30 minutes

## 2021-09-09 NOTE — ED PROVIDER NOTES
"  History     Chief Complaint   Patient presents with     Alcohol Intoxication     seeking detox from alcohol, drinks 12-1.5oz bottles of vodka/day; also taking LSD \"almost everyday\". Has hx of inhaling nitrous but says he has not used in over a year.      Depression     mom says pt is suicidal, pt states, \"I'm done with life but it's not up to me to decide my time to go\"     HPI  Rickey L Brunner Jr. is a 31 year old male with history of alcohol abuse and LSD abuse who presents for evaluation for alcohol detox.  Patient reports he drinks a liter of alcohol daily.  States he has been doing this for years.  States he also uses LSD frequently but did not use this today.  He is unsure if he has ever had a withdrawal seizure or DTs.  He apparently made a statement that he was done with life to mom earlier but denies any suicidal ideation currently.  Denies any asked to harm himself.  Denies any nausea, vomiting, diarrhea, chest pain, shortness of breath, fever, cough, or other complaints.    Allergies:  No Known Allergies    Problem List:    Patient Active Problem List    Diagnosis Date Noted     Alcohol abuse 08/28/2021     Priority: Medium     Alcohol withdrawal syndrome without complication (H) 02/19/2021     Priority: Medium        Past Medical History:    Past Medical History:   Diagnosis Date     Alcohol dependence (H)        Past Surgical History:    Past Surgical History:   Procedure Laterality Date     ENT SURGERY      tubes       Family History:    Family History   Problem Relation Age of Onset     Cancer Father        Social History:  Marital Status:  Single [1]  Social History     Tobacco Use     Smoking status: Current Every Day Smoker     Smokeless tobacco: Never Used   Substance Use Topics     Alcohol use: Yes     Comment: over 12 oz vodka/day     Drug use: Yes     Types: Marijuana, LSD        Medications:    hydrOXYzine (ATARAX) 25 MG tablet          Review of Systems   ROS: 10 point ROS neg other than " "the symptoms noted above in the HPI.  Physical Exam   BP: 124/85  Pulse: 82  Temp: (!) 96.7  F (35.9  C)  Resp: 16  Height: 177.8 cm (5' 10\")  Weight: 76.2 kg (168 lb)  SpO2: 96 %      Physical Exam  Vitals reviewed.   Constitutional:       General: He is not in acute distress.     Appearance: He is well-developed.   HENT:      Head: Normocephalic and atraumatic.   Eyes:      Extraocular Movements: Extraocular movements intact.      Conjunctiva/sclera: Conjunctivae normal.      Pupils: Pupils are equal, round, and reactive to light.   Cardiovascular:      Rate and Rhythm: Normal rate and regular rhythm.      Pulses: Normal pulses.      Heart sounds: Normal heart sounds. No murmur heard.     Pulmonary:      Effort: Pulmonary effort is normal. No respiratory distress.      Breath sounds: Normal breath sounds. No stridor. No wheezing or rales.   Abdominal:      General: Bowel sounds are normal. There is no distension.      Palpations: Abdomen is soft. There is no mass.      Tenderness: There is no abdominal tenderness. There is no guarding or rebound.   Musculoskeletal:         General: Normal range of motion.      Cervical back: Normal range of motion and neck supple.   Skin:     General: Skin is warm and dry.      Capillary Refill: Capillary refill takes less than 2 seconds.      Findings: No rash.   Neurological:      General: No focal deficit present.      Mental Status: He is alert and oriented to person, place, and time.      GCS: GCS eye subscore is 4. GCS verbal subscore is 5. GCS motor subscore is 6.      Cranial Nerves: No cranial nerve deficit.      Sensory: No sensory deficit.      Motor: No weakness or abnormal muscle tone.   Psychiatric:         Mood and Affect: Mood normal.         ED Course        Procedures                  Labs Ordered and Resulted from Time of ED Arrival Up to the Time of Departure from the ED   COMPREHENSIVE METABOLIC PANEL - Abnormal; Notable for the following components:       " Result Value    Calcium 8.3 (*)     Glucose 128 (*)     All other components within normal limits   DRUG ABUSE SCREEN 1 URINE (ED) - Abnormal; Notable for the following components:    Cannabinoids Urine Screen Positive (*)     All other components within normal limits   ALCOHOL BREATH TEST POCT - Abnormal; Notable for the following components:    Alcohol Breath Test 0.241 (*)     All other components within normal limits   MAGNESIUM - Normal   COVID-19 VIRUS (CORONAVIRUS) BY PCR - Normal    Narrative:     Testing was performed using the [employer]  SARS-CoV-2 & Influenza A/B Assay on the [employer]ides  Pinky  System.  This test should be ordered for the detection of SARS-COV-2 in individuals who meet SARS-CoV-2 clinical and/or epidemiological criteria. Test performance is unknown in asymptomatic patients.  This test is for in vitro diagnostic use under the FDA EUA for laboratories certified under CLIA to perform moderate and/or high complexity testing. This test has not been FDA cleared or approved.  A negative test does not rule out the presence of PCR inhibitors in the specimen or target RNA in concentration below the limit of detection for the assay. The possibility of a false negative should be considered if the patient's recent exposure or clinical presentation suggests COVID-19.  [facility] are certified under the Clinical Laboratory Improvement Amendments of 1988 (CLIA-88) as qualified to perform moderate and/or high complexity laboratory testing.   CBC WITH PLATELETS & DIFFERENTIAL    Narrative:     The following orders were created for panel order CBC with platelets differential.  Procedure                               Abnormality         Status                     ---------                               -----------         ------                     CBC with platelets and d...[[id number]]                      Final result                 Please view results for these tests on the individual orders.    CBC WITH PLATELETS AND DIFFERENTIAL   DOCUMENT IN LEGAL HOLD NAVIGATOR   URINE DRUGS OF ABUSE SCREEN    Narrative:     The following orders were created for panel order Urine Drugs of Abuse Screen.  Procedure                               Abnormality         Status                     ---------                               -----------         ------                     Drug abuse screen 1 urin...[[id number]]  Abnormal            Final result                 Please view results for these tests on the individual orders.         Medications   multivitamin, therapeutic (THERA-VIT) tablet 1 tablet (1 tablet Oral Given 8/27/21 2226)   folic acid (FOLVITE) tablet 1 mg (1 mg Oral Given 8/27/21 2225)   thiamine (B-1) tablet 100 mg (100 mg Oral Given 8/27/21 2225)   nicotine (NICORETTE) gum 2 mg (2 mg Buccal Given 8/27/21 2226)   nicotine (NICORETTE) gum 2 mg (2 mg Buccal Given 8/28/21 0014)       Assessments & Plan (with Medical Decision Making)   Patient presents for alcohol detox.  Here his breathalyzer was 0.241.  No sign of withdrawal at this time.  He was placed on MSSA protocol.  He was given oral thiamine, folic acid, and multivitamin.  He denies any medical complaints.  No current suicidal ideation.  He is voluntary.  He denies any medical concerns.  Laboratory studies were obtained and largely unremarkable as above.  He is medically stable for detox admission.  He was accepted to the detox floor in stable condition.    I have reviewed the nursing notes.    I have reviewed the findings, diagnosis, plan and need for follow up with the patient.        Final diagnoses:   Alcohol abuse       8/27/2021   [facility]     Dominga Orourke MD  09/09/21 7083